# Patient Record
Sex: MALE | Race: WHITE | Employment: FULL TIME | URBAN - METROPOLITAN AREA
[De-identification: names, ages, dates, MRNs, and addresses within clinical notes are randomized per-mention and may not be internally consistent; named-entity substitution may affect disease eponyms.]

---

## 2018-04-06 PROBLEM — S00.31XA ABRASION, NOSE WITH INFECTION: Status: ACTIVE | Noted: 2018-04-06

## 2018-04-06 PROBLEM — L08.9 ABRASION, NOSE WITH INFECTION: Status: ACTIVE | Noted: 2018-04-06

## 2018-04-06 PROBLEM — Z12.11 COLON CANCER SCREENING: Status: ACTIVE | Noted: 2018-04-06

## 2019-05-15 ENCOUNTER — HOSPITAL ENCOUNTER (OUTPATIENT)
Dept: ULTRASOUND IMAGING | Age: 64
Discharge: HOME | End: 2019-05-15
Attending: NURSE PRACTITIONER

## 2019-05-15 DIAGNOSIS — Z87.19 HISTORY OF ABDOMINAL HERNIA: ICD-10-CM

## 2019-05-15 NOTE — PROGRESS NOTES
This has been fully explained to the patient, who indicates understanding that per Pinky NP noted US was negative for hernia

## 2019-09-24 PROBLEM — Z12.11 COLON CANCER SCREENING: Status: RESOLVED | Noted: 2018-04-06 | Resolved: 2019-09-24

## 2020-06-09 ENCOUNTER — HOSPITAL ENCOUNTER (OUTPATIENT)
Dept: PHYSICAL THERAPY | Age: 65
Discharge: HOME | End: 2020-06-09
Payer: COMMERCIAL

## 2020-06-09 DIAGNOSIS — G89.29 CHRONIC RIGHT SHOULDER PAIN: ICD-10-CM

## 2020-06-09 DIAGNOSIS — M25.511 CHRONIC RIGHT SHOULDER PAIN: ICD-10-CM

## 2020-06-09 PROCEDURE — 97161 PT EVAL LOW COMPLEX 20 MIN: CPT

## 2020-06-09 PROCEDURE — 97110 THERAPEUTIC EXERCISES: CPT

## 2020-06-09 NOTE — PROGRESS NOTES
Natalie Jim  : 1955  Payor: Ector Dent / [place]: [place] [facility] / Product Type: O /  [address] [facility] at [address] [place] [address]. [facility], [address] [place] [address]  [phone]   [phone]                                                          Marisela Cartwright MD      OUTPATIENT PHYSICAL THERAPY: Daily Treatment Note 2020 Visit Count:  1    Tx Diagnosis:  Pain in Right Shoulder (M25.511)  Stiffness of Right Shoulder not elsewhere specififed (M25.611)        Pre-treatment Symptoms/Complaints:  See Initial Eval Dated 6/10/2020 for more details. Pain: Initial:2/10 Post Session: 1/10   Medications Last Reviewed:  2020     Updated Objective Findings: See Initial Eval for more details. TREATMENT:   THERAPEUTIC EXERCISE: (25 minutes):  Exercises per grid below to improve mobility, strength and balance. Required minimal visual, verbal and manual cues to promote proper body alignment and promote proper body posture. Progressed resistance and complexity of movement as indicated. Date:  2020 Date:   Date:     Activity/Exercise Parameters Parameters Parameters   Education HEP, POC, PT goals, anatomy/pathology. Trigger points, Dry needling, Neck invovlvment. Differential diagnosis     Sleeper stretch 30sec     Thoracic extension 30 times     No Money 30 grn                             THERAPEUTIC ACTIVITY: ( 0 minutes): Activities per gid below to improve functional movement related mobility, strength and balance to improve neuro-muscular carryover to daily functional activities for improving patient's quality of life. Required visual, verbal and manual cues to promote proper body alignment and promote proper body posture/mechanics. Progressed resistance and complexity of movement as indicated.      Date:  2020 Date:   Date:     Activity/Exercise Parameters Parameters Parameters                             [place]                                             MANUAL THERAPY: (0 minutes): Joint mobilization, Soft tissue mobilization was utilized and necessary because of the patient's restricted joint motion and restricted motion of soft tissue mobility. Date  6/9/2020    Technique Used Grade  Level # Time(s) Effect while being performed                                                                 HEP Log Date 1.    6/9/2020   2.  6/9/2020   3. 6/9/2020   4.    5.           [employer] Portal  Treatment/Session Summary:    Response to Treatment: Pt demonstrated understanding of POC and initial HEP. No increase in pain or adverse reactions. Communication/Consultation:  POC, HEP, PT goals, Faxed initial evaluation to MD.   Equipment provided today: HEP Handout     Recommendations/Intent for next treatment session:   Next visit will focus on Manual Therapy Core Stability Dry Needling RTC strengthening soft tissue mobilization. Treatment Plan of Care Effective Dates: 6/9/2020 TO 8/9/2020 (60 days).   Frequency/Duration: 2 times a week for 60 Days             Total Treatment Billable Duration:   25  Rx plus Criss Jones, PT   TIME IN 5804  TIME OUT: [address]    Future Appointments   Date Time Provider Javan Holguin   6/16/2020  2:00 PM Chayito Webb, PT [facility]   5/24/2021  7:55 AM MAT LAB [facility]   6/3/2021 10:00 AM MD JESSICA Vega [facility]

## 2020-06-09 NOTE — THERAPY EVALUATION
Gucci Wilkinson  : 1955      Payor: Diamond Lo / Plan: [place] [facility] / Product Type: PPO /    [address]. [address] [place] [address], [facility], [address] [place] [address] Road  [phone]   [phone]              OUTPATIENT PHYSICAL THERAPY:Initial Assessment: 6/10/2020    ICD-10: Treatment Diagnosis:   Pain in Right Shoulder (M25.511)  Stiffness of Right Shoulder not elsewhere specififed (M25.611)              Precautions/Allergies:   Patient has no known allergies. Fall Risk Score: 1 (? 5 = High Risk)  MD Orders: Eval and Treat  MEDICAL/REFERRING DIAGNOSIS:  Chronic right shoulder pain [M25.511, G89.29]   DATE OF ONSET: 2 months ago  REFERRING PHYSICIAN: Mary Huynh MD  RETURN PHYSICIAN APPOINTMENT: TBD by patient      INITIAL ASSESSMENT:   Mr. Hinds Nicely presents to physical therapy with decreased strength, ROM, joint mobility, functional mobility. These S/S are consistent with Shoulder Pain. Patient will benefit from skilled physical therapy for manual therapeutic techniques (as appropriate), therapeutic exercises and activities, neuromuscular re-education, and comprehensive home exercises program to address current impairments and functional limitations. Gucci Wilkinson will benefit from skilled PT (medically necessary) in order to address above deficits affecting participation in basic ADLs and overall functional tolerance. PROBLEM LIST (Impacting functional limitations):  · Decreased Strength  · Decreased ADL/Functional Activities  · Increased Pain  · Decreased Activity Tolerance  · Increased Shortness of Breath  · Decreased Flexibility/Joint Mobility  · Decreased [place] with Home Exercise Program INTERVENTIONS PLANNED:  1. Cold  2. Family Education  3. Home Exercise Program (HEP)  4. Manual Therapy  5. Neuromuscular Re-education/Strengthening  6. Range of Motion (ROM)  7. Therapeutic Activites  8.  Therapeutic Exercise/Strengthening  9. Transfer Training  10. Ultrasound   TREATMENT PLAN:  Effective Dates: 6/10/2020 TO 8/9/2020 (60 days). Frequency/Duration: 2 times a week for 60 Days  GOALS: (Goals have been discussed and agreed upon with patient.)     Short-Term Goals~4 weeks  Goal Met   1. Ivana Santana will report <=2/10 pain with don/doffing clothing as well as minimal/no difficulty. 1.  [] Date:   2. Ivana Santana will demonstrate improvement in active shoulder Flexion to >180 degrees to increase UE function and participation in ADLs. 2.  [] Date:   3. Ivana Santana will demonstrate demonstrate improvement in active shoulder Internal ROtaiton to >T12 degrees to increase UE function and participation in ADLs. 3.  [] Date:   4. Ivana Santana will show a greater than 8 point decrease on the DASH in order to show an increase in upper extremity function. 4.  [] Date:   5. Ivana Santana will be independent in all HEP 5.  [] Date:   6.  6.  [] Date:         Long Term Goals~8 weeks Goal Met   1. Ivana Santana will show full ROM of the UE in order to return to full functional mobility  1. [] Date:   2. Ivana Santana will show a greater than 15 point decrease on the DASH in order to show an increase in upper extremity function 2. [] Date:   3. Ivana Santana will report doing hair/bathing without difficulty and <=2/10 pain in order to be independent with ADL's 3.  [] Date:   4. Ivana Santana will be independent in all advanced HEP 4.  [] Date:            Outcome Measure: Tool Used: Disabilities of the Arm, Shoulder and Hand (DASH) Questionnaire - Quick Version  Score:  Initial: 27%  Most Recent: X/55 (Date: -- )   Interpretation of Score: The DASH is designed to measure the activities of daily living in person's with upper extremity dysfunction or pain. Each section is scored on a 1-5 scale, 5 representing the greatest disability. The scores of each section are added together for a total score of 55. Medical Necessity:   · Skilled intervention continues to be required due to deficits and impairments seen upon initial evaluation affecting patient's participation in ADLs and functional tasks. Reason for Services/Other Comments:  · Patient continues to require skilled intervention due to deficits and impairments seen upon initial evaluation affecting patient's participation in ADLs and functional tasks. Total Treatment Duration:   TIME IN: 1405  TIME OUT: 1502  Rehabilitation Potential For Stated Goals: excellent  Regarding Nidhi Steinerxander's therapy, I certify that the treatment plan above will be carried out by a therapist or under their direction. Thank you for this referral,  Alexis Sanchez, PT     Referring Physician Signature: Chad Sanchez MD              Date                    HISTORY:   History of Present Injury/Illness (Reason for Referral):  Pt reports a history of shoulder injury from skiing about two years ago. Pt had since felt a difference in each side but slowly imporved over time. Approximately two months ago he reaggravated his shoulder when he was on the roof and has since noticed a decreased actviity tolerance In his right shoulder that will sometimes radiate to his right elbow.      -Present symptoms/complaints (on day of evaluation)  Pain Scale:  · Current: 3/10  · Best: 0/10  · Worst: 5/10    · Aggravating factors: Lifting, Carrying, Placing cup on top shelf and Overhead activities  · Relieving factors: rest by side  · Irritability: Low (Onset of pain is is longer than the time it takes for Pain to go away)    Dominant Side:  right  Past Medical History/Comorbidities:   Mr. Franklyn Messer  has no past medical history on file. Mr. Franklyn Messer  has a past surgical history that includes hx other surgical and hx hernia repair.   Social History/Living Environment:      Prior Level of Function/Work/Activity:  Normal  Other Clinical Tests:         none  Current Medications:    Current Outpatient Medications:     varicella-zoster recombinant, PF, (Shingrix, PF,) 50 mcg/0.5 mL susr injection, IM: 0.5 mL administered as a 2-dose series at 0 and 2 to 6 months later, Disp: 0.5 mL, Rfl: 1    methylPREDNISolone (Medrol, Volodymyr,) 4 mg tablet, Take 1 Tab by mouth Specific Days and Specific Times. , Disp: 1 Dose Pack, Rfl: 0    tadalafiL (CIALIS) 5 mg tablet, Take 1 Tab by mouth daily. , Disp: 30 Tab, Rfl: 5    multivitamin (ONE A DAY) tablet, Take 1 Tab by mouth daily. Takes occassionally, Disp: , Rfl:         Ambulatory/Rehab Services H2 Model Falls Risk Assessment    Risk Factors:       (1)  Gender [Male] Ability to Rise from Chair:       (0)  Ability to rise in a single movement    Falls Prevention Plan:       No modifications necessary   Total: (5 or greater = High Risk): 1    ©2010 [facility] of [employer]. All Rights Reserved. [facility] [employer] Patent #[id number]. Federal Law prohibits the replication, distribution or use without written permission from [facility] [employer]       Date Last Reviewed:  6/10/2020   Number of Personal Factors/Comorbidities that affect the Plan of Care: 0: LOW COMPLEXITY   EXAMINATION:   Observation/Orthostatic Postural Assessment:     Forward Head and Rounded Shoulders  Palpation:          Increased tone of Infras/ teres minor  ROM:    AROM/PROM         Joint: Eval Date: 6/10/2020  Re-Assess Date:  Re-Assess Date:    ACTIVE ROM (standing) RIGHT LEFT RIGHT LEFT RIGHT LEFT   Shoulder Flexion 170 170           Shoulder Abduction 130 123           Shoulder Internal Rotation (Apley's) T12 L3           Shoulder External Rotation (Apley's) FULL FULL           Elbow ROM             PASSIVE ROM (supine)             Shoulder Flexion             Shoulder Abduction             Shoulder Internal Rotation             Shoulder External Rotation                                           [facility]      Strength:    Joint[de-identified] Date: 6/10/2020  Re-Assess Date:  Re-Assess Date:     RIGHT LEFT RIGHT LEFT RIGHT LEFT   Shoulder Flexion 4/5 4/5           Shoulder Abduction  (C5) 4+/5 4/5           Shoulder Internal Rotation 4/5 4/5           Shoulder External Rotation 4+/5 4/5           Elbow Flexion  (C6) 4-/5 4-/5           Elbow Extension (C7) 5/5 5/5           Wrist Flexion (C7) 5/5 4+/5           Wrist Extension (C6)             Resisted Thumb Extension/Finger Abduction (C8/T1)             Resisted Cervical Rotation (C1):             Resisted Shoulder Shrug (C2, 3, 4):               Strength                                                        Manual:  Joint Directon Grade Treatment Effect    Distraction and Posterior Inferior II and III Unremarkable     Special Tests:     Neer's: Negative   Painful Arc   Weakness    Neurological Screen: Assessed @ Initial Visit    Radiating symptoms? Yes/No=none  Functional Mobility:  Assessed @ Initial Visit         Body Structures Involved:  1. Bones  2. Joints  3. Muscles  4. Ligaments Body Functions Affected:  1. Sensory/Pain  2. Neuromusculoskeletal  3. Movement Related Activities and Participation Affected:  1. Mobility  2. Self Care   Number of elements that affect the Plan of Care: 3: MODERATE COMPLEXITY   CLINICAL PRESENTATION:   Presentation: Evolving clinical presentation with changing clinical characteristics: MODERATE COMPLEXITY   CLINICAL DECISION MAKING:      Use of outcome tool(s) and clinical judgement create a POC that gives a: Clear prediction of patient's progress: LOW COMPLEXITY     See associated treatment note for treatment provided today.     Future Appointments   Date Time Provider Javan Holguin   6/16/2020  2:00 PM Laury Rangel PT [facility]   5/24/2021  7:55 AM MAT LAB [facility]   6/3/2021 10:00 AM Jose L Rollins [place] MD [facility]         Zonia Oconnell, PT

## 2020-06-16 ENCOUNTER — HOSPITAL ENCOUNTER (OUTPATIENT)
Dept: PHYSICAL THERAPY | Age: 65
Discharge: HOME | End: 2020-06-16
Payer: COMMERCIAL

## 2020-06-16 PROCEDURE — 97110 THERAPEUTIC EXERCISES: CPT

## 2020-06-16 PROCEDURE — 97140 MANUAL THERAPY 1/> REGIONS: CPT

## 2020-06-16 NOTE — PROGRESS NOTES
Natalie Jim  : 1955  Payor: Ector Dent / Plan: [place] [employer] [facility] / Product Type: PPO /  [address]. [facility], [address] [place] [address], [facility], [address] [place] [address]  [phone]   [phone]                                                          Marisela Cartwright MD      OUTPATIENT PHYSICAL THERAPY: Daily Treatment Note 2020 Visit Count:  2    Tx Diagnosis:  Pain in Right Shoulder (M25.511)  Stiffness of Right Shoulder not elsewhere specififed (M25.611)        Pre-treatment Symptoms/Complaints:  Pt reports that he doesnt have any pain while exercising but he feels like his arm feels slightly numb. Pain: Initial:2/10 Post Session: 1/10   Medications Last Reviewed:  2020     Updated Objective Findings: See Initial Eval for more details. TREATMENT:   THERAPEUTIC EXERCISE: (45 minutes):  Exercises per grid below to improve mobility, strength and balance. Required minimal visual, verbal and manual cues to promote proper body alignment and promote proper body posture. Progressed resistance and complexity of movement as indicated. Date:  2020 Date:  2020 Date:     Activity/Exercise Parameters Parameters Parameters   Education HEP, POC, PT goals, anatomy/pathology. Trigger points, Dry needling, Neck invovlvment. Differential diagnosis     Sleeper stretch 30sec 30xs    Thoracic extension 30 times 3 min    No Money 30 grn Black  Band 30xs    I,T,Y  30xs each    rows  Purple 30xs     walkouts  30xs    punches  30xs    SNAGS  4 min                THERAPEUTIC ACTIVITY: ( 0 minutes): Activities per gid below to improve functional movement related mobility, strength and balance to improve neuro-muscular carryover to daily functional activities for improving patient's quality of life. Required visual, verbal and manual cues to promote proper body alignment and promote proper body posture/mechanics.  Progressed resistance and complexity of movement as indicated. Date:  6/9/2020 Date:  6/16/2020 Date:     Activity/Exercise Parameters Parameters Parameters                                                                               MANUAL THERAPY: (0 minutes): Joint mobilization, Soft tissue mobilization was utilized and necessary because of the patient's restricted joint motion and restricted motion of soft tissue mobility. Date  6/16/2020    Technique Used Grade Level # Time(s) Effect while being performed   traction II,III Cervical 11 Improved subjective symptoms                                                          HEP Log Date 1.    6/16/2020   2.  6/16/2020   3. 6/16/2020   4.    5.           [employer] Portal  Treatment/Session Summary:    Response to Treatment: Pt continued with strengthening but respopnded favorably to distraction. No pain wiwth exercises and had improved shoulder discomfort after traction   Communication/Consultation:  POC, HEP, PT goals, Faxed initial evaluation to MD.   Equipment provided today: HEP Handout     Recommendations/Intent for next treatment session:   Next visit will focus on Manual Therapy Core Stability Dry Needling RTC strengthening soft tissue mobilization. Treatment Plan of Care Effective Dates: 6/9/2020 TO 8/9/2020 (60 days).   Frequency/Duration: 2 times a week for 60 Days             Total Treatment Billable Duration:   56  Rx  PT Patient Time In/Time Out  Time In: 8407  Time Out: Lawrence 70, PT     Future Appointments   Date Time Provider Javan Holguin   6/19/2020  7:30 AM Fariha Enriquez PT [facility]   5/24/2021  7:55 AM MAT LAB [facility] ALY[facility]   6/3/2021 10:00 AM Sony Arriaga MD [facility] LAYO [facility]

## 2020-06-19 ENCOUNTER — HOSPITAL ENCOUNTER (OUTPATIENT)
Dept: PHYSICAL THERAPY | Age: 65
Discharge: HOME | End: 2020-06-19
Payer: COMMERCIAL

## 2020-06-19 PROCEDURE — 97140 MANUAL THERAPY 1/> REGIONS: CPT

## 2020-06-19 PROCEDURE — 97110 THERAPEUTIC EXERCISES: CPT

## 2020-06-19 NOTE — PROGRESS NOTES
Natalie Jim  : 1955  Payor: Ector Dent / [place]: [place] [employer] [facility] / Product Type: PPO /  [address]. [facility], [address] [place] [address], [facility], [address] [place] [address]  [phone]   [phone]                                                          Marisela Cartwright MD      OUTPATIENT PHYSICAL THERAPY: Daily Treatment Note 2020 Visit Count:  3    Tx Diagnosis:  Pain in Right Shoulder (M25.511)  Stiffness of Right Shoulder not elsewhere specififed (M25.611)        Pre-treatment Symptoms/Complaints:  Pt reports mild shoulder soreness from exercises. Mild pain in forearm. Pain: Initial:2/10 Post Session: 1/10   Medications Last Reviewed:  2020     Updated Objective Findings: + cozens        TREATMENT:   THERAPEUTIC EXERCISE: (42 minutes):  Exercises per grid below to improve mobility, strength and balance. Required minimal visual, verbal and manual cues to promote proper body alignment and promote proper body posture. Progressed resistance and complexity of movement as indicated. Date:  2020 Date:  2020 Date:  2020   Activity/Exercise Parameters Parameters Parameters   Education HEP, POC, PT goals, anatomy/pathology. Trigger points, Dry needling, Neck invovlvment. Differential diagnosis  Education on anatomy and biomechanics of common extensor tendon and tennis elbow. Sleeper stretch 30sec 30xs    Thoracic extension 30 times 3 min    No Money 30 grn Black  Band 30xs    I,T,Y  30xs each    rows  Purple 30xs     walkouts  30xs    punches  30xs Punch progression 30xs blue band   SNAGS  4 min    UBE   8 min   taping   2 min   Jose R Twist   Yellow 20xs   Wrist extension   30xs blue 2 up 4 down eccentric         THERAPEUTIC ACTIVITY: ( 0 minutes):  Activities per gid below to improve functional movement related mobility, strength and balance to improve neuro-muscular carryover to daily functional activities for improving patient's quality of life. Required visual, verbal and manual cues to promote proper body alignment and promote proper body posture/mechanics. Progressed resistance and complexity of movement as indicated. Date:  6/9/2020 Date:  6/16/2020 Date:  6/19/2020   Activity/Exercise Parameters Parameters Parameters                                                                               MANUAL THERAPY: (12 minutes): Joint mobilization, Soft tissue mobilization was utilized and necessary because of the patient's restricted joint motion and restricted motion of soft tissue mobility. Date  6/19/2020    Technique Used Grade Level # Time(s) Effect while being performed   Active release  Common extensor tendon 12 Reproduction of discomfort                                                          HEP Log Date 1.    6/19/2020   2.  6/19/2020   3. 6/19/2020   4.    5.           [employer] Portal  Treatment/Session Summary:    Response to Treatment: Pt focused on elbow pain today. Able to target area with mild production of symptoms that persisted after manual. Taping was added to take pressure of load on common extensor tendon. Communication/Consultation:  POC, HEP, PT goals, Faxed initial evaluation to MD.   Equipment provided today: HEP Handout     Recommendations/Intent for next treatment session:   Next visit will focus on Manual Therapy Core Stability Dry Needling RTC strengthening soft tissue mobilization. Treatment Plan of Care Effective Dates: 6/9/2020 TO 8/9/2020 (60 days).   Frequency/Duration: 2 times a week for 60 Days             Total Treatment Billable Duration:   54  Rx  PT Patient Time In/Time Out  Time In: 7605  Time Out: 0225  Tina Jones PT     Future Appointments   Date Time Provider Javan Holguin   6/23/2020  4:00 PM Tamiko Jenkins [facility]   6/26/2020  7:30 AM Chayito Webb PT [facility]   5/24/2021  7:55 AM MAT LAB [facility]   6/3/2021 10:00 AM Marisela Cartwright MD [facility]

## 2020-06-26 ENCOUNTER — HOSPITAL ENCOUNTER (OUTPATIENT)
Dept: PHYSICAL THERAPY | Age: 65
Discharge: HOME | End: 2020-06-26
Payer: COMMERCIAL

## 2020-06-26 PROCEDURE — 97530 THERAPEUTIC ACTIVITIES: CPT

## 2020-06-26 PROCEDURE — 97110 THERAPEUTIC EXERCISES: CPT

## 2020-06-26 PROCEDURE — 97140 MANUAL THERAPY 1/> REGIONS: CPT

## 2020-06-26 NOTE — PROGRESS NOTES
Justa Adel  : 1955  Payor: Brice Blackburn / Plan: [facility] / Product Type: O /  [address] [facility] at [address] [place] [address]. [facility], [address] [place] [address], [facility], [address] [place] [address]  [phone]   [phone]                                                          Mariah Mcclure MD      OUTPATIENT PHYSICAL THERAPY: Daily Treatment Note 2020 Visit Count:  4    Tx Diagnosis:  Pain in Right Shoulder (M25.511)  Stiffness of Right Shoulder not elsewhere specififed (M25.611)        Pre-treatment Symptoms/Complaints:  Pt reports that he had a couple of days with completely no pain in her arm or shoulder but he felt last night his forearm start to bother him. Pain: Initial:2/10 Post Session: 1/10   Medications Last Reviewed:  2020     Updated Objective Findings: + cozens        TREATMENT:   THERAPEUTIC EXERCISE: (19 minutes):  Exercises per grid below to improve mobility, strength and balance. Required minimal visual, verbal and manual cues to promote proper body alignment and promote proper body posture. Progressed resistance and complexity of movement as indicated. Date:  2020 Date:  2020 Date:  2020 Date  2020   Activity/Exercise Parameters Parameters Parameters    Education HEP, POC, PT goals, anatomy/pathology. Trigger points, Dry needling, Neck invovlvment. Differential diagnosis  Education on anatomy and biomechanics of common extensor tendon and tennis elbow. Education on neuromuscular    Sleeper stretch 30sec 30xs     Thoracic extension 30 times 3 min     No Money 30 grn Black  Band 30xs     I,T,Y  30xs each     rows  Purple 30xs      walkouts  30xs     punches  30xs Punch progression 30xs blue band 30xs black band   SNAGS  4 min     UBE   8 min 8 UBE   taping   2 min    Jose R Twist   Yellow 20xs 20xs   Wrist extension   30xs blue 2 up 4 down eccentric 30xs blue          THERAPEUTIC ACTIVITY: ( 12 minutes):  Activities per gid below to improve functional movement related mobility, strength and balance to improve neuro-muscular carryover to daily functional activities for improving patient's quality of life. Required visual, verbal and manual cues to promote proper body alignment and promote proper body posture/mechanics. Progressed resistance and complexity of movement as indicated. Date:  6/9/2020 Date:  6/16/2020 Date:  6/19/2020 Date  6/26/2020   Activity/Exercise Parameters Parameters Parameters    Bucket carry       30 lbs 4xs   Overhead carry       15 lbs 4xs   Landmine press       Bar 20xs each                                                 MANUAL THERAPY: (23 minutes): Joint mobilization, Soft tissue mobilization was utilized and necessary because of the patient's restricted joint motion and restricted motion of soft tissue mobility. Date  6/26/2020    Technique Used Grade Level # Time(s) Effect while being performed   Active release  Common extensor tendon 12 Reproduction of discomfort   Cervical traction   11 Improved neck motion                                                   HEP Log Date 1.    6/26/2020   2.  6/26/2020   3. 6/26/2020   4.    5.           [employer] Portal  Treatment/Session Summary:    Response to Treatment: Pt continud with shoulder strengthening and progressed with open chain exercises to improve scapular and RTC strengthening. Pt continues to benefit from manual therapy to improve extensor pain. Communication/Consultation:  POC, HEP, PT goals, Faxed initial evaluation to MD.   Equipment provided today: HEP Handout     Recommendations/Intent for next treatment session:   Next visit will focus on Manual Therapy Core Stability Dry Needling RTC strengthening soft tissue mobilization. Treatment Plan of Care Effective Dates: 6/9/2020 TO 8/9/2020 (60 days).   Frequency/Duration: 2 times a week for 60 Days             Total Treatment Billable Duration:   54  Rx  PT Patient Time In/Time Out  Time In: 5838  Time Out: [address], PT     Future Appointments   Date Time Provider Javan Greggi   6/30/2020  7:30 AM Saranya Ozuna PT [facility]   5/24/2021  7:55 AM MAT LAB [facility]   6/3/2021 10:00 AM Nory Berry MD [facility]

## 2020-06-30 ENCOUNTER — HOSPITAL ENCOUNTER (OUTPATIENT)
Dept: PHYSICAL THERAPY | Age: 65
Discharge: HOME | End: 2020-06-30
Payer: COMMERCIAL

## 2020-06-30 PROCEDURE — 97140 MANUAL THERAPY 1/> REGIONS: CPT

## 2020-06-30 PROCEDURE — 97110 THERAPEUTIC EXERCISES: CPT

## 2020-06-30 PROCEDURE — 97530 THERAPEUTIC ACTIVITIES: CPT

## 2020-06-30 NOTE — PROGRESS NOTES
Mary Julian  : 1955  Payor: Mateo Niraj / Plan: [facility] / Product Type: PPO /  [address] [facility] at [address] [place] [address]. [facility], [address] [place] [address], [address] [place] [address]  [phone]   [phone]                                                          Katlyn Ramirez MD      OUTPATIENT PHYSICAL THERAPY: Daily Treatment Note 2020 Visit Count:  5    Tx Diagnosis:  Pain in Right Shoulder (M25.511)  Stiffness of Right Shoulder not elsewhere specififed (M25.611)        Pre-treatment Symptoms/Complaints:  Pt reports that he feels like he hadminor pain throughout the week and started slowly getting better. Pain: Initial:2/10 Post Session: 1/10   Medications Last Reviewed:  2020     Updated Objective Findings: + cozens        TREATMENT:   THERAPEUTIC EXERCISE: (38 minutes):  Exercises per grid below to improve mobility, strength and balance. Required minimal visual, verbal and manual cues to promote proper body alignment and promote proper body posture. Progressed resistance and complexity of movement as indicated. Date:  2020 Date:  2020 Date:  2020 Date  2020 Date  2020   Activity/Exercise Parameters Parameters Parameters     Education HEP, POC, PT goals, anatomy/pathology. Trigger points, Dry needling, Neck invovlvment. Differential diagnosis  Education on anatomy and biomechanics of common extensor tendon and tennis elbow.    Education on neuromuscular     Sleeper stretch 30sec 30xs      Thoracic extension 30 times 3 min   30'xs black band   No Money 30 grn Black  Band 30xs      I,T,Y  30xs each      rows  Purple 30xs    13 each 30xs   walkouts  30xs      punches  30xs Punch progression 30xs blue band 30xs black band 30xs black band   SNAGS  4 min      UBE   8 min 8 UBE 8 min   taping   2 min     Jose R Twist   Yellow 20xs 20xs 30xs blue   Wrist extension   30xs blue 2 up 4 down eccentric 30xs blue  30xs blue THERAPEUTIC ACTIVITY: ( 8 minutes): Activities per gid below to improve functional movement related mobility, strength and balance to improve neuro-muscular carryover to daily functional activities for improving patient's quality of life. Required visual, verbal and manual cues to promote proper body alignment and promote proper body posture/mechanics. Progressed resistance and complexity of movement as indicated. Date:  6/9/2020 Date:  6/16/2020 Date:  6/19/2020 Date  6/26/2020 Date  6/30/2020   Activity/Exercise Parameters Parameters Parameters     Bucket carry       30 lbs 4xs 30 lbs 4xs   Overhead carry       15 lbs 4xs 20 lbs 4xs   Landmine press       Bar 20xs each                                                      MANUAL THERAPY: (10 minutes): Joint mobilization, Soft tissue mobilization was utilized and necessary because of the patient's restricted joint motion and restricted motion of soft tissue mobility. Date  6/30/2020    Technique Used Grade Level # Time(s) Effect while being performed   Dry needling   10 min Common extensor Tendon                                                          HEP Log Date 1.    6/30/2020   2.  6/30/2020   3. 6/30/2020   4.    5.           [facility] Portal  Treatment/Session Summary:    Response to Treatment: COnitnued with shoulder strengthening and started dry needling to common extensor tendon to ease elbow pain. Pt elicited twitch response and had mild soreness after. No adverse effects after needling. Communication/Consultation:  POC, HEP, PT goals, Faxed initial evaluation to MD.   Equipment provided today: HEP Handout     Recommendations/Intent for next treatment session:   Next visit will focus on Manual Therapy Core Stability Dry Needling RTC strengthening soft tissue mobilization. Treatment Plan of Care Effective Dates: 6/9/2020 TO 8/9/2020 (60 days).   Frequency/Duration: 2 times a week for 60 Days       Dry Needles Used: 1   Dry Needles Removed: 1           Total Treatment Billable Duration:   54  Rx  PT Patient Time In/Time Out  Time In: 7118  Time Out: [address], PT     Future Appointments   Date Time Provider Javan Holguin   7/8/2020  7:30 AM Nicolasa Hamilton, PT [facility]   5/24/2021  7:55 AM MAT LAB [facility]   6/3/2021 10:00 AM Radha Humphries MD [facility]

## 2020-07-08 ENCOUNTER — HOSPITAL ENCOUNTER (OUTPATIENT)
Dept: PHYSICAL THERAPY | Age: 65
Discharge: HOME | End: 2020-07-08
Payer: COMMERCIAL

## 2020-07-08 PROCEDURE — 97530 THERAPEUTIC ACTIVITIES: CPT

## 2020-07-08 PROCEDURE — 97110 THERAPEUTIC EXERCISES: CPT

## 2020-07-08 NOTE — PROGRESS NOTES
Barbara López  : 1955  Payor: Adan Marte / Plan: [facility] / Product Type: O /  [address] [facility] at [address] [place] [address]. [facility], [address] [place] [address], [address]  [phone]   [phone]                                                          Jessi Peterson MD      OUTPATIENT PHYSICAL THERAPY: Daily Treatment Note 2020 Visit Count:  6    Tx Diagnosis:  Pain in Right Shoulder (M25.511)  Stiffness of Right Shoulder not elsewhere specififed (M25.611)        Pre-treatment Symptoms/Complaints:  Pt self reports an imporvement of 85% in his forearm and very mild pain. Pain: Initial:2/10 Post Session: 1/10   Medications Last Reviewed:  2020     Updated Objective Findings: + cozens        TREATMENT:   THERAPEUTIC EXERCISE: (17 minutes):  Exercises per grid below to improve mobility, strength and balance. Required minimal visual, verbal and manual cues to promote proper body alignment and promote proper body posture. Progressed resistance and complexity of movement as indicated. Date:  2020 Date:  2020 Date  2020 Date  2020 Date  2020   Activity/Exercise Parameters Parameters      Education  Education on anatomy and biomechanics of common extensor tendon and tennis elbow. Education on neuromuscular      Sleeper stretch 30xs       Thoracic extension 3 min   30'xs black band    No Money Black  Band 30xs       I,T,Y 30xs each       rows Purple 30xs    13 each 30xs 10.5 3x10   walkouts 30xs       punches 30xs Punch progression 30xs blue band 30xs black band 30xs black band    SNAGS 4 min        UBE  8 min 8 UBE 8 min 8 min   taping  2 min      Jose R Twist  Yellow 20xs 20xs 30xs blue 30xs   Wrist extension  30xs blue 2 up 4 down eccentric 30xs blue  30xs blue  30xs         THERAPEUTIC ACTIVITY: ( 43 minutes):  Activities per gid below to improve functional movement related mobility, strength and balance to improve neuro-muscular carryover to daily functional activities for improving patient's quality of life. Required visual, verbal and manual cues to promote proper body alignment and promote proper body posture/mechanics. Progressed resistance and complexity of movement as indicated. Date:  6/16/2020 Date:  6/19/2020 Date  6/26/2020 Date  6/30/2020 Date  7/8/2020   Activity/Exercise Parameters Parameters      Bucket carry     30 lbs 4xs 30 lbs 4xs 30 lbs 4xs   Overhead carry     15 lbs 4xs 20 lbs 4xs 20 lbs 4xs   Landmine press     Bar 20xs each     Shoulder taps       30xs   Diagonals       30xs blue   Body blades       1 min 3 xs     Serratus punch       3x10         MANUAL THERAPY: (0 minutes): Joint mobilization, Soft tissue mobilization was utilized and necessary because of the patient's restricted joint motion and restricted motion of soft tissue mobility. Date  7/8/2020    Technique Used Grade Level # Time(s) Effect while being performed                                                                 HEP Log Date 1.    7/8/2020   2.  7/8/2020   3. 7/8/2020   4.    5.           [employer] Portal  Treatment/Session Summary:    Response to Treatment: Focused on Shoulder Strengthening and RTC stabilization. No pain with exercises and had improved tolerance to gripping at common extensor tendon. Communication/Consultation:  POC, HEP, PT goals, Faxed initial evaluation to MD.   Equipment provided today: HEP Handout     Recommendations/Intent for next treatment session:   Next visit will focus on Manual Therapy Core Stability Dry Needling RTC strengthening soft tissue mobilization. Treatment Plan of Care Effective Dates: 6/9/2020 TO 8/9/2020 (60 days).   Frequency/Duration: 2 times a week for 60 Days       Dry Needles Used: 1   Dry Needles Removed: 1           Total Treatment Billable Duration:   60  Rx  PT Patient Time In/Time Out  Time In: 0730  Time Out: [address] Baljit Hernández PT Future Appointments   Date Time Provider Javan Holguin   7/14/2020  7:30 AM Suraj Calderon PT [facility]   5/24/2021  7:55 AM MAT LAB [facility]   6/3/2021 10:00 AM Christin Fitzpatrick MD [facility]

## 2021-06-03 PROBLEM — M50.30 DEGENERATIVE DISC DISEASE, CERVICAL: Status: ACTIVE | Noted: 2017-01-23

## 2021-06-03 PROBLEM — L08.9 ABRASION, NOSE WITH INFECTION: Status: RESOLVED | Noted: 2018-04-06 | Resolved: 2021-06-03

## 2021-06-03 PROBLEM — S00.31XA ABRASION, NOSE WITH INFECTION: Status: RESOLVED | Noted: 2018-04-06 | Resolved: 2021-06-03

## 2022-03-20 PROBLEM — M50.30 DEGENERATIVE DISC DISEASE, CERVICAL: Status: ACTIVE | Noted: 2017-01-23

## 2022-05-24 DIAGNOSIS — Z00.00 WELCOME TO MEDICARE PREVENTIVE VISIT: Primary | ICD-10-CM

## 2022-06-09 ENCOUNTER — NURSE ONLY (OUTPATIENT)
Dept: INTERNAL MEDICINE CLINIC | Facility: CLINIC | Age: 67
End: 2022-06-09

## 2022-06-09 DIAGNOSIS — Z00.00 WELCOME TO MEDICARE PREVENTIVE VISIT: ICD-10-CM

## 2022-06-10 LAB
ALBUMIN SERPL-MCNC: 4.1 G/DL (ref 3.2–4.6)
ALBUMIN/GLOB SERPL: 1.4 {RATIO} (ref 1.2–3.5)
ALP SERPL-CCNC: 85 U/L (ref 50–136)
ALT SERPL-CCNC: 25 U/L (ref 12–65)
ANION GAP SERPL CALC-SCNC: 7 MMOL/L (ref 7–16)
AST SERPL-CCNC: 16 U/L (ref 15–37)
BASOPHILS # BLD: 0 K/UL (ref 0–0.2)
BASOPHILS NFR BLD: 0 % (ref 0–2)
BILIRUB SERPL-MCNC: 1.5 MG/DL (ref 0.2–1.1)
BUN SERPL-MCNC: 16 MG/DL (ref 8–23)
CALCIUM SERPL-MCNC: 9.2 MG/DL (ref 8.3–10.4)
CHLORIDE SERPL-SCNC: 105 MMOL/L (ref 98–107)
CHOLEST SERPL-MCNC: 240 MG/DL
CO2 SERPL-SCNC: 27 MMOL/L (ref 21–32)
CREAT SERPL-MCNC: 1 MG/DL (ref 0.8–1.5)
DIFFERENTIAL METHOD BLD: NORMAL
EOSINOPHIL # BLD: 0.1 K/UL (ref 0–0.8)
EOSINOPHIL NFR BLD: 1 % (ref 0.5–7.8)
ERYTHROCYTE [DISTWIDTH] IN BLOOD BY AUTOMATED COUNT: 14.2 % (ref 11.9–14.6)
GLOBULIN SER CALC-MCNC: 3 G/DL (ref 2.3–3.5)
GLUCOSE SERPL-MCNC: 82 MG/DL (ref 65–100)
HCT VFR BLD AUTO: 45.5 % (ref 41.1–50.3)
HDLC SERPL-MCNC: 85 MG/DL (ref 40–60)
HDLC SERPL: 2.8 {RATIO}
HGB BLD-MCNC: 14.6 G/DL (ref 13.6–17.2)
IMM GRANULOCYTES # BLD AUTO: 0 K/UL (ref 0–0.5)
IMM GRANULOCYTES NFR BLD AUTO: 0 % (ref 0–5)
LDLC SERPL CALC-MCNC: 141.8 MG/DL
LYMPHOCYTES # BLD: 2 K/UL (ref 0.5–4.6)
LYMPHOCYTES NFR BLD: 35 % (ref 13–44)
MCH RBC QN AUTO: 29.9 PG (ref 26.1–32.9)
MCHC RBC AUTO-ENTMCNC: 32.1 G/DL (ref 31.4–35)
MCV RBC AUTO: 93.2 FL (ref 79.6–97.8)
MONOCYTES # BLD: 0.5 K/UL (ref 0.1–1.3)
MONOCYTES NFR BLD: 8 % (ref 4–12)
NEUTS SEG # BLD: 3.1 K/UL (ref 1.7–8.2)
NEUTS SEG NFR BLD: 56 % (ref 43–78)
NRBC # BLD: 0 K/UL (ref 0–0.2)
PLATELET # BLD AUTO: 268 K/UL (ref 150–450)
PMV BLD AUTO: 9.9 FL (ref 9.4–12.3)
POTASSIUM SERPL-SCNC: 4.6 MMOL/L (ref 3.5–5.1)
PROT SERPL-MCNC: 7.1 G/DL (ref 6.3–8.2)
RBC # BLD AUTO: 4.88 M/UL (ref 4.23–5.6)
SODIUM SERPL-SCNC: 139 MMOL/L (ref 136–145)
TRIGL SERPL-MCNC: 66 MG/DL (ref 35–150)
TSH, 3RD GENERATION: 1.4 UIU/ML (ref 0.36–3.74)
VLDLC SERPL CALC-MCNC: 13.2 MG/DL (ref 6–23)
WBC # BLD AUTO: 5.6 K/UL (ref 4.3–11.1)

## 2022-06-20 ENCOUNTER — OFFICE VISIT (OUTPATIENT)
Dept: INTERNAL MEDICINE CLINIC | Facility: CLINIC | Age: 67
End: 2022-06-20
Payer: MEDICARE

## 2022-06-20 VITALS
HEART RATE: 64 BPM | WEIGHT: 159 LBS | TEMPERATURE: 97.9 F | HEIGHT: 68 IN | OXYGEN SATURATION: 99 % | SYSTOLIC BLOOD PRESSURE: 118 MMHG | DIASTOLIC BLOOD PRESSURE: 76 MMHG | BODY MASS INDEX: 24.1 KG/M2

## 2022-06-20 DIAGNOSIS — Z12.11 COLON CANCER SCREENING: ICD-10-CM

## 2022-06-20 DIAGNOSIS — Z23 NEED FOR PROPHYLACTIC VACCINATION AGAINST STREPTOCOCCUS PNEUMONIAE (PNEUMOCOCCUS): ICD-10-CM

## 2022-06-20 DIAGNOSIS — Z00.00 WELCOME TO MEDICARE PREVENTIVE VISIT: Primary | ICD-10-CM

## 2022-06-20 DIAGNOSIS — Z11.4 SCREENING FOR HIV WITHOUT PRESENCE OF RISK FACTORS: ICD-10-CM

## 2022-06-20 DIAGNOSIS — Z23 NEED FOR PROPHYLACTIC VACCINATION AND INOCULATION AGAINST VARICELLA: ICD-10-CM

## 2022-06-20 PROBLEM — M54.2 NECK PAIN: Status: ACTIVE | Noted: 2017-01-23

## 2022-06-20 PROCEDURE — 3017F COLORECTAL CA SCREEN DOC REV: CPT | Performed by: INTERNAL MEDICINE

## 2022-06-20 PROCEDURE — 1123F ACP DISCUSS/DSCN MKR DOCD: CPT | Performed by: INTERNAL MEDICINE

## 2022-06-20 PROCEDURE — 90677 PCV20 VACCINE IM: CPT | Performed by: INTERNAL MEDICINE

## 2022-06-20 PROCEDURE — G0402 INITIAL PREVENTIVE EXAM: HCPCS | Performed by: INTERNAL MEDICINE

## 2022-06-20 SDOH — HEALTH STABILITY: PHYSICAL HEALTH: ON AVERAGE, HOW MANY MINUTES DO YOU ENGAGE IN EXERCISE AT THIS LEVEL?: 80 MIN

## 2022-06-20 SDOH — HEALTH STABILITY: PHYSICAL HEALTH: ON AVERAGE, HOW MANY DAYS PER WEEK DO YOU ENGAGE IN MODERATE TO STRENUOUS EXERCISE (LIKE A BRISK WALK)?: 4 DAYS

## 2022-06-20 ASSESSMENT — LIFESTYLE VARIABLES
HOW OFTEN DURING THE LAST YEAR HAVE YOU BEEN UNABLE TO REMEMBER WHAT HAPPENED THE NIGHT BEFORE BECAUSE YOU HAD BEEN DRINKING: 0
HOW OFTEN DURING THE LAST YEAR HAVE YOU FAILED TO DO WHAT WAS NORMALLY EXPECTED FROM YOU BECAUSE OF DRINKING: 0
HAS A RELATIVE, FRIEND, DOCTOR, OR ANOTHER HEALTH PROFESSIONAL EXPRESSED CONCERN ABOUT YOUR DRINKING OR SUGGESTED YOU CUT DOWN: NO
HOW OFTEN DURING THE LAST YEAR HAVE YOU FOUND THAT YOU WERE NOT ABLE TO STOP DRINKING ONCE YOU HAD STARTED: 0
HOW MANY STANDARD DRINKS CONTAINING ALCOHOL DO YOU HAVE ON A TYPICAL DAY: 1 OR 2
HOW OFTEN DURING THE LAST YEAR HAVE YOU FAILED TO DO WHAT WAS NORMALLY EXPECTED FROM YOU BECAUSE OF DRINKING: NEVER
HAVE YOU OR SOMEONE ELSE BEEN INJURED AS A RESULT OF YOUR DRINKING: 0
HOW OFTEN DO YOU HAVE SIX OR MORE DRINKS ON ONE OCCASION: 1
HOW OFTEN DO YOU HAVE A DRINK CONTAINING ALCOHOL: 5
HAS A RELATIVE, FRIEND, DOCTOR, OR ANOTHER HEALTH PROFESSIONAL EXPRESSED CONCERN ABOUT YOUR DRINKING OR SUGGESTED YOU CUT DOWN: 0
HOW OFTEN DURING THE LAST YEAR HAVE YOU NEEDED AN ALCOHOLIC DRINK FIRST THING IN THE MORNING TO GET YOURSELF GOING AFTER A NIGHT OF HEAVY DRINKING: 0
HAVE YOU OR SOMEONE ELSE BEEN INJURED AS A RESULT OF YOUR DRINKING: NO
HOW OFTEN DURING THE LAST YEAR HAVE YOU HAD A FEELING OF GUILT OR REMORSE AFTER DRINKING: NEVER
HOW OFTEN DURING THE LAST YEAR HAVE YOU FOUND THAT YOU WERE NOT ABLE TO STOP DRINKING ONCE YOU HAD STARTED: NEVER
HOW OFTEN DURING THE LAST YEAR HAVE YOU BEEN UNABLE TO REMEMBER WHAT HAPPENED THE NIGHT BEFORE BECAUSE YOU HAD BEEN DRINKING: NEVER
HOW OFTEN DO YOU HAVE A DRINK CONTAINING ALCOHOL: 4 OR MORE TIMES A WEEK
HOW MANY STANDARD DRINKS CONTAINING ALCOHOL DO YOU HAVE ON A TYPICAL DAY: 1
HOW OFTEN DURING THE LAST YEAR HAVE YOU NEEDED AN ALCOHOLIC DRINK FIRST THING IN THE MORNING TO GET YOURSELF GOING AFTER A NIGHT OF HEAVY DRINKING: NEVER
HOW OFTEN DURING THE LAST YEAR HAVE YOU HAD A FEELING OF GUILT OR REMORSE AFTER DRINKING: 0

## 2022-06-20 ASSESSMENT — PATIENT HEALTH QUESTIONNAIRE - PHQ9
SUM OF ALL RESPONSES TO PHQ QUESTIONS 1-9: 0
2. FEELING DOWN, DEPRESSED OR HOPELESS: 0
SUM OF ALL RESPONSES TO PHQ QUESTIONS 1-9: 0
1. LITTLE INTEREST OR PLEASURE IN DOING THINGS: 0
SUM OF ALL RESPONSES TO PHQ9 QUESTIONS 1 & 2: 0

## 2022-06-20 ASSESSMENT — VISUAL ACUITY
OD_CC: 20/20
OS_CC: 20/20

## 2022-06-20 NOTE — PATIENT INSTRUCTIONS
Personalized Preventive Plan for Kelley Hung - 6/20/2022  Medicare offers a range of preventive health benefits. Some of the tests and screenings are paid in full while other may be subject to a deductible, co-insurance, and/or copay. Some of these benefits include a comprehensive review of your medical history including lifestyle, illnesses that may run in your family, and various assessments and screenings as appropriate. After reviewing your medical record and screening and assessments performed today your provider may have ordered immunizations, labs, imaging, and/or referrals for you. A list of these orders (if applicable) as well as your Preventive Care list are included within your After Visit Summary for your review. Other Preventive Recommendations:    · A preventive eye exam performed by an eye specialist is recommended every 1-2 years to screen for glaucoma; cataracts, macular degeneration, and other eye disorders. · A preventive dental visit is recommended every 6 months. · Try to get at least 150 minutes of exercise per week or 10,000 steps per day on a pedometer . · Order or download the FREE \"Exercise & Physical Activity: Your Everyday Guide\" from [employer]. Call 2-991.954.2794 or search [employer] online. · You need 6293-0922 mg of calcium and 1823-9799 IU of vitamin D per day. It is possible to meet your calcium requirement with diet alone, but a vitamin D supplement is usually necessary to meet this goal.  · When exposed to the sun, use a sunscreen that protects against both UVA and UVB radiation with an SPF of 30 or greater. Reapply every 2 to 3 hours or after sweating, drying off with a towel, or swimming. · Always wear a seat belt when traveling in a car. Always wear a helmet when riding a bicycle or motorcycle.

## 2022-06-20 NOTE — PROGRESS NOTES
Medicare Annual Wellness Visit    Venancio Berry is here for Medicare AWV and Results    Assessment & Plan   Welcome to Medicare preventive visit  Need for prophylactic vaccination against Streptococcus pneumoniae (pneumococcus)  -     Pneumococcal Conjugate PCV20, PF (Prevnar 20)  Need for prophylactic vaccination and inoculation against varicella  Screening for HIV without presence of risk factors  -     HIV 1/2 Ag/Ab, 4TH Generation,W Rflx Confirm; Future  Colon cancer screening  -     [facility]- Colonoscopy      Recommendations for Preventive Services Due: see orders and patient instructions/AVS.  Recommended screening schedule for the next 5-10 years is provided to the patient in written form: see Patient Instructions/AVS.    1. Welcome to Medicare preventive visit       2. Need for prophylactic vaccination against Streptococcus pneumoniae (pneumococcus)     - Pneumococcal Conjugate PCV20, PF (Prevnar 20)    3. Need for prophylactic vaccination and inoculation against varicella  Has received 1 of 2 Shingrix. The patient and I discussed the risk and benefits of shingles vaccine. Given the patient's age I believe he would be a good candidate for this. Based on CDC recommendations, I recommend the Shingrix Vaccine for you. Healthy adults 50 years and older should get two doses of Shingrix,  by 2 to 6 months. You should get Shingrix even if in the past you received Zostavax. There is no maximum age for getting Shingrix. If you had shingles in the past, you can get Shingrix to help prevent future occurrences of the disease. There is no specific length of time that you need to wait after having shingles before you can receive Shingrix, but generally you should make sure the shingles rash has gone away before getting vaccinated. Recommended to be purchased and administered your local pharmacy         4.  Screening for HIV without presence of risk factors  USPTF recommendation   - HIV 1/2 Ag/Ab, 4TH Generation,W Rflx Confirm; Future    5. Colon cancer screening  Recent blood in stool. Resolved. Has been over ten years since last colonoscopy. Recommend colonoscopy screening. Cologuard negative 2020  The patient was counseled on the risks and benefits of colonoscopy and the importance of colon cancer screening. Major risks include bleeding (1/1000), perforation (1/1000), missed lesions, and reactions to sedation medications or bowel prep. We discussed the various alternatives which could include flexible sigmoidoscopy, contrast enema, CT colonography, and FOBT, and fecal DNA testing. I explained the risks and benefits of each of these and why I feel colonoscopy is the best test for the patient. - [facility]- Colonoscopy    6. BPH- Follow up with urology for ongoing management. No uti sx. Return in 6 months (on 12/20/2022) for Medicare Annual Wellness Visit in 1 year. Subjective   The following acute and/or chronic problems were also addressed today:  Patient is following up with Dr. Nicolas Machuca with urology in regards to his BPH and elevated PSA. MRI of the prostate or biopsy being considered with repeat PSA in a few months. Urology notes are reviewed and discussed. He was recently in [place] and developed hot tub folliculitis and was treated with Keflex. He developed some brief bloody diarrhea around that same time. This resolved. He has not had any further rectal bleeding issue or blood in his stool. No syncope or chest pain abdominal pain. He had a negative Cologuard in 2020 and has had a colonoscopy prior to this over 10 years ago. He was referred for colonoscopy in 2018 and this was delayed and then when the pandemic came along 2020 he elected for Cologuard testing.     Family History   Problem Relation Age of Onset    Alzheimer's Disease Father     Heart Disease Mother     Cancer Mother breast       Lab Results   Component Value Date    WBC 5.6 06/09/2022    HGB 14.6 06/09/2022    HCT 45.5 06/09/2022    MCV 93.2 06/09/2022    RDW 14.2 06/09/2022     06/09/2022    NEUTOPHILPCT 51 05/24/2021    LYMPHOPCT 35 06/09/2022    LYMPHOPCT 37 05/24/2021    MONOPCT 8 06/09/2022    MONOPCT 9 05/24/2021    EOSRELPCT 1 06/09/2022    BASOPCT 0 06/09/2022    BASOPCT 1 05/24/2021    LYMPHSABS 2.0 06/09/2022    LYMPHSABS 2.0 05/24/2021    MONOSABS 0.5 06/09/2022    MONOSABS 0.5 05/24/2021    EOSABS 0.1 06/09/2022    EOSABS 0.1 05/24/2021    BASOSABS 0.0 06/09/2022    IMMGRAN 0 06/09/2022    GRANULOCYTEABSOLUTECOUNT 0.0 05/24/2021       Lab Results   Component Value Date     06/09/2022    K 4.6 06/09/2022     06/09/2022    CO2 27 06/09/2022    ANIONGAP 7 06/09/2022    GLUCOSE 82 06/09/2022    BUN 16 06/09/2022    CREATININE 1.00 06/09/2022    GFRAA >60 06/09/2022    LABGLOM >60 06/09/2022    CALCIUM 9.2 06/09/2022    BILITOT 1.5 06/09/2022    ALT 25 06/09/2022    AST 16 06/09/2022    ALKPHOS 85 06/09/2022    ALKPHOS 63 05/24/2021    PROT 7.1 06/09/2022    LABALBU 4.1 06/09/2022    GLOB 3.0 06/09/2022    ALBUMIN 1.4 06/09/2022       Lab Results   Component Value Date    CHOL 240 06/09/2022    HDL 85 06/09/2022    TRIG 66 06/09/2022    LDLCALC 141.8 06/09/2022    VLDL 12 05/24/2021       No results found for: LABA1C    Lab Results   Component Value Date    TSH 1.740 05/24/2021       Lab Results   Component Value Date    PSA 5.2 03/08/2022    PSA 3.3 11/13/2020       Lab Results   Component Value Date    SPECGRAV 1.028 05/24/2021    PHUR 5.5 05/24/2021    COLORU Yellow 05/24/2021    CLARITYU Clear 05/24/2021    LEUKOCYTESUR Negative 05/24/2021    PROTEINU Negative 05/24/2021    GLUCOSEU Negative 05/24/2021    KETUA Negative 05/24/2021    BLOODU Negative 05/24/2021    BILIRUBINUR Negative 05/24/2021    UROBILINOGEN 0.2 05/24/2021    NITRU Negative 05/24/2021    LABMICR Comment 05/24/2021 Patient's complete Health Risk Assessment and screening values have been reviewed and are found in Flowsheets. The following problems were reviewed today and where indicated follow up appointments were made and/or referrals ordered.     Positive Risk Factor Screenings with Interventions:               General Health and ACP:  General  In general, how would you say your health is?: Very Good  In the past 7 days, have you experienced any of the following: New or Increased Pain, New or Increased Fatigue, Loneliness, Social Isolation, Stress or Anger?: No  Do you get the social and emotional support that you need?: Yes  Do you have a Living Will?: (!) No    Advance Directives     Power of  Living Will ACP-Advance Directive ACP-Power of     Not on File Not on File Not on File Not on File      General Health Risk Interventions:  · no issues       Hearing/Vision:  Do you or your family notice any trouble with your hearing that hasn't been managed with hearing aids?: No  Do you have difficulty driving, watching TV, or doing any of your daily activities because of your eyesight?: No  Have you had an eye exam within the past year?: (!) No   Visual Acuity Screening    Right eye Left eye Both eyes   Without correction:      With correction: 20/20 20/20 20/20     Hearing/Vision Interventions:  · no issues    Safety:  Do you have working smoke detectors?: Yes  Do you have any tripping hazards - loose or unsecured carpets or rugs?: No  Do you have any tripping hazards - clutter in doorways, halls, or stairs?: No  Do you have either shower bars, grab bars, non-slip mats or non-slip surfaces in your shower or bathtub?: (!) No  Do all of your stairways have a railing or banister?: Yes  Do you always fasten your seatbelt when you are in a car?: Yes    Safety Interventions:  · no issues           Objective   Vitals:    06/20/22 1112   BP: 118/76   Site: Left Upper Arm   Position: Sitting   Cuff Size: Small Adult Pulse: 64   Temp: 97.9 °F (36.6 °C)   TempSrc: Temporal   SpO2: 99%   Weight: 159 lb (72.1 kg)   Height: 5' 8\" (1.727 m)      Body mass index is 24.18 kg/m². General Appearance: alert and oriented to person, place and time, well developed and well- nourished, in no acute distress  Skin: warm and dry, no rash or erythema  Head: normocephalic and atraumatic  Eyes: pupils equal, round, and reactive to light, extraocular eye movements intact, conjunctivae normal  ENT: tympanic membrane, external ear and ear canal normal bilaterally, nose without deformity, nasal mucosa and turbinates normal without polyps  Neck: supple and non-tender without mass, no thyromegaly or thyroid nodules, no cervical lymphadenopathy  Pulmonary/Chest: clear to auscultation bilaterally- no wheezes, rales or rhonchi, normal air movement, no respiratory distress  Cardiovascular: normal rate, regular rhythm, normal S1 and S2, no murmurs, rubs, clicks, or gallops, distal pulses intact, no carotid bruits  Abdomen: soft, non-tender, non-distended, normal bowel sounds, no masses or organomegaly  Extremities: no cyanosis, clubbing or edema  Musculoskeletal: normal range of motion, no joint swelling, deformity or tenderness  Neurologic: reflexes normal and symmetric, no cranial nerve deficit, gait, coordination and speech normal       Not on File  Prior to Visit Medications    Medication Sig Taking?  Authorizing Provider   tadalafil (CIALIS) 5 MG tablet Take 5 mg by mouth daily  Ar Automatic Reconciliation       CareTeam (Including outside providers/suppliers regularly involved in providing care):   Patient Care Team:  Ray Martínez MD as PCP - Moris Hurtado MD as PCP - Richard Mejia Provider     Reviewed and updated this visit:  Tobacco  Meds  Problems  Med Hx  Surg Hx  Soc Hx  Fam Hx

## 2022-07-13 ENCOUNTER — OFFICE VISIT (OUTPATIENT)
Dept: UROLOGY | Age: 67
End: 2022-07-13
Payer: MEDICARE

## 2022-07-13 DIAGNOSIS — R97.20 BPH WITH ELEVATED PSA: ICD-10-CM

## 2022-07-13 DIAGNOSIS — N40.0 BPH WITH ELEVATED PSA: ICD-10-CM

## 2022-07-13 DIAGNOSIS — N40.0 BENIGN PROSTATIC HYPERPLASIA, UNSPECIFIED WHETHER LOWER URINARY TRACT SYMPTOMS PRESENT: Primary | ICD-10-CM

## 2022-07-13 LAB
BILIRUBIN, URINE, POC: NEGATIVE
BLOOD URINE, POC: NEGATIVE
GLUCOSE URINE, POC: NEGATIVE
KETONES, URINE, POC: NEGATIVE
LEUKOCYTE ESTERASE, URINE, POC: NEGATIVE
NITRITE, URINE, POC: NEGATIVE
PH, URINE, POC: 6 (ref 4.6–8)
PROTEIN,URINE, POC: NEGATIVE
SPECIFIC GRAVITY, URINE, POC: 1.02 (ref 1–1.03)
URINALYSIS CLARITY, POC: NORMAL
URINALYSIS COLOR, POC: NORMAL
UROBILINOGEN, POC: NORMAL

## 2022-07-13 PROCEDURE — 1036F TOBACCO NON-USER: CPT | Performed by: UROLOGY

## 2022-07-13 PROCEDURE — 81003 URINALYSIS AUTO W/O SCOPE: CPT | Performed by: UROLOGY

## 2022-07-13 PROCEDURE — 99213 OFFICE O/P EST LOW 20 MIN: CPT | Performed by: UROLOGY

## 2022-07-13 PROCEDURE — 1123F ACP DISCUSS/DSCN MKR DOCD: CPT | Performed by: UROLOGY

## 2022-07-13 PROCEDURE — G8420 CALC BMI NORM PARAMETERS: HCPCS | Performed by: UROLOGY

## 2022-07-13 PROCEDURE — G8427 DOCREV CUR MEDS BY ELIG CLIN: HCPCS | Performed by: UROLOGY

## 2022-07-13 PROCEDURE — 3017F COLORECTAL CA SCREEN DOC REV: CPT | Performed by: UROLOGY

## 2022-07-13 ASSESSMENT — ENCOUNTER SYMPTOMS
NAUSEA: 0
BACK PAIN: 0

## 2022-07-13 NOTE — PROGRESS NOTES
[facility]  [facility] [place]  [phone]    Kirstie Radford  : 1955    Chief Complaint   Patient presents with    Follow-up        HPI     Kirstie Radford is a 73 y.o. male  with elevated PSA, BPH/LUTS. PSA had been stable but went up to 5.2 in 3-. PSA was 2.6 in , 3.2 in 2018, 3.3 in 2019, 3.3. in . PSA was 4.34 in  at work physical.   He has had BPH/LUTS for years. Has weak stream. Has had several episodes where he thought he might not be able to void. Takes Cialis 5 mg daily. , he thinks that he mainly needs it for his LUTS. ED is not much of a problem. Likes Cialis better than tamsulosin. No family history of prostate cancer. JENNIFER showed 3+ prostate without nodules. No past medical history on file. Past Surgical History:   Procedure Laterality Date    HERNIA REPAIR      OTHER SURGICAL HISTORY      Hydrocell     Current Outpatient Medications   Medication Sig Dispense Refill    tadalafil (CIALIS) 5 MG tablet Take 5 mg by mouth daily       No current facility-administered medications for this visit. No Known Allergies  Social History     Socioeconomic History    Marital status:      Spouse name: Not on file    Number of children: Not on file    Years of education: Not on file    Highest education level: Not on file   Occupational History    Not on file   Tobacco Use    Smoking status: Never Smoker    Smokeless tobacco: Never Used   Substance and Sexual Activity    Alcohol use:  Yes    Drug use: No    Sexual activity: Not on file   Other Topics Concern    Not on file   Social History Narrative    Not on file     Social Determinants of Health     Financial Resource Strain:     Difficulty of Paying Living Expenses: Not on file   Food Insecurity:     Worried About Running Out of Food in the Last Year: Not on file    Ran Out of Food in the Last Year: Not on file   Transportation Needs:     Lack of Transportation (Medical): Not on file    Lack of Transportation (Non-Medical): Not on file   Physical Activity: Sufficiently Active    Days of Exercise per Week: 4 days    Minutes of Exercise per Session: 80 min   Stress:     Feeling of Stress : Not on file   Social Connections:     Frequency of Communication with Friends and Family: Not on file    Frequency of Social Gatherings with Friends and Family: Not on file    Attends Scientologist Services: Not on file    Active Member of [address] [place] [address] [employer] or Organizations: Not on file    Attends Club or Organization Meetings: Not on file    Marital Status: Not on file   Intimate Partner Violence:     Fear of Current or Ex-Partner: Not on file    Emotionally Abused: Not on file    Physically Abused: Not on file    Sexually Abused: Not on file   Housing Stability:     Unable to Pay for Housing in the Last Year: Not on file    Number of [place] in the Last Year: Not on file    Unstable Housing in the Last Year: Not on file     Family History   Problem Relation Age of Onset    Alzheimer's Disease Father     Heart Disease Mother     Cancer Mother         breast       Review of Systems  Constitutional:   Negative for fever. Cardiovascular:  Negative for chest pain. GI:  Negative for nausea. Genitourinary:  Negative for urinary burning. Musculoskeletal:  Negative for back pain. There were no vitals taken for this visit.     Urinalysis  UA - Dipstick  Results for orders placed or performed in visit on 07/13/22   AMB POC URINALYSIS DIP STICK AUTO W/O MICRO   Result Value Ref Range    Color (UA POC)      Clarity (UA POC)      Glucose, Urine, POC Negative Negative    Bilirubin, Urine, POC Negative Negative    KETONES, Urine, POC Negative Negative    Specific Gravity, Urine, POC 1.020 1.001 - 1.035    Blood (UA POC) Negative Negative    pH, Urine, POC 6.0 4.6 - 8.0    Protein, Urine, POC Negative Negative    Urobilinogen, POC Normal     Nitrite, Urine, POC Negative Negative    Leukocyte Esterase, Urine, POC Negative Negative   Results for orders placed or performed in visit on 11/13/20   AMB POC URINALYSIS DIP STICK AUTO W/O MICRO   Result Value Ref Range    Color (UA POC) Yellow     Clarity (UA POC) Clear     Glucose, Urine, POC Negative Negative    Bilirubin, Urine, POC Negative Negative    KETONES, Urine, POC Negative Negative    Specific Gravity, Urine, POC 1.025 1.001 - 1.035 NA    Blood (UA POC) Negative Negative    pH, Urine, POC 6.0 4.6 - 8.0 NA    Protein, Urine, POC Negative Negative    Urobilinogen, POC 0.2 mg/dL 0.2 - 1    Nitrite, Urine, POC Negative Negative    Leukocyte Esterase, Urine, POC Negative Negative       UA - Micro  WBC - 0  RBC - 0  Bacteria - 0  Epith - 0    Physical Exam    Assessment and Plan    ICD-10-CM    1. Benign prostatic hyperplasia, unspecified whether lower urinary tract symptoms present  N40.0 AMB POC URINALYSIS DIP STICK AUTO W/O MICRO     PSA, Total and Free   2. BPH with elevated PSA  N40.0     R97.20        Orders Placed This Encounter   Procedures    PSA, Total and Free     Standing Status:   Future     Standing Expiration Date:   7/13/2023    AMB POC URINALYSIS DIP STICK AUTO W/O MICRO     PSA II today. Call patient with results  May need MRI. Continues daily Cialis 5 mg. Follow-up and Dispositions    · Return for call patient to arrange follow-up.

## 2022-07-14 LAB
PSA FREE MFR SERPL: 24.3 %
PSA FREE SERPL-MCNC: 0.9 NG/ML
PSA SERPL-MCNC: 3.7 NG/ML

## 2022-07-19 ENCOUNTER — TELEPHONE (OUTPATIENT)
Dept: UROLOGY | Age: 67
End: 2022-07-19

## 2022-07-25 RX ORDER — TADALAFIL 5 MG/1
5 TABLET ORAL DAILY
Qty: 30 TABLET | Refills: 5 | Status: SHIPPED | OUTPATIENT
Start: 2022-07-25

## 2022-07-25 RX ORDER — TADALAFIL 5 MG/1
TABLET ORAL
Qty: 30 TABLET | Refills: 2 | OUTPATIENT
Start: 2022-07-25

## 2022-07-25 NOTE — TELEPHONE ENCOUNTER
Pt called and LVM stating Rx was not sent at [address] [place] [address]  and is requesting it be sent in to [facility]. Are you able to send this in since [facility] is out of office for the week?

## 2022-12-13 ENCOUNTER — OFFICE VISIT (OUTPATIENT)
Dept: INTERNAL MEDICINE CLINIC | Facility: CLINIC | Age: 67
End: 2022-12-13
Payer: MEDICARE

## 2022-12-13 VITALS
HEART RATE: 57 BPM | WEIGHT: 163 LBS | TEMPERATURE: 97.3 F | DIASTOLIC BLOOD PRESSURE: 70 MMHG | BODY MASS INDEX: 24.71 KG/M2 | HEIGHT: 68 IN | SYSTOLIC BLOOD PRESSURE: 122 MMHG | OXYGEN SATURATION: 95 %

## 2022-12-13 DIAGNOSIS — Z00.00 LABORATORY EXAM ORDERED AS PART OF ROUTINE GENERAL MEDICAL EXAMINATION: ICD-10-CM

## 2022-12-13 DIAGNOSIS — N40.0 BENIGN PROSTATIC HYPERPLASIA WITHOUT LOWER URINARY TRACT SYMPTOMS: ICD-10-CM

## 2022-12-13 DIAGNOSIS — Z12.5 ENCOUNTER FOR PROSTATE CANCER SCREENING: ICD-10-CM

## 2022-12-13 DIAGNOSIS — K57.90 DIVERTICULOSIS: Primary | ICD-10-CM

## 2022-12-13 PROCEDURE — G8427 DOCREV CUR MEDS BY ELIG CLIN: HCPCS | Performed by: INTERNAL MEDICINE

## 2022-12-13 PROCEDURE — 1036F TOBACCO NON-USER: CPT | Performed by: INTERNAL MEDICINE

## 2022-12-13 PROCEDURE — 99213 OFFICE O/P EST LOW 20 MIN: CPT | Performed by: INTERNAL MEDICINE

## 2022-12-13 PROCEDURE — G8484 FLU IMMUNIZE NO ADMIN: HCPCS | Performed by: INTERNAL MEDICINE

## 2022-12-13 PROCEDURE — G8420 CALC BMI NORM PARAMETERS: HCPCS | Performed by: INTERNAL MEDICINE

## 2022-12-13 PROCEDURE — 1123F ACP DISCUSS/DSCN MKR DOCD: CPT | Performed by: INTERNAL MEDICINE

## 2022-12-13 PROCEDURE — 3017F COLORECTAL CA SCREEN DOC REV: CPT | Performed by: INTERNAL MEDICINE

## 2022-12-13 ASSESSMENT — ENCOUNTER SYMPTOMS
ANAL BLEEDING: 0
ABDOMINAL PAIN: 0

## 2022-12-13 NOTE — PROGRESS NOTES
ASSESSMENT/PLAN:    Colonoscopy with diverticulosis. Increase fiber. Follow-up colonoscopy in 10 years. PSA is being monitored by urology. PSA decreased. No voiding issues at this time. Appreciate Dr. Jennifer Bell assistance. Labs in 6 months with annual wellness visit. Evaluation and management of the chronic condition(s) delineated. No negative side effects reported. I have reviewed all the lab results. There are some abnormalities that are either expected or not critical to the patient's health, and are discussed in the office today and are addressed. Please refer to the above assessement and plan narrative and orders and follow up plan. Medication discussed and refilled as needed. Physical exam findings are stable unless otherwise indicated and this is addressed. The most recent lab work and imaging and consultant/urgent care visits and imaging are reviewed and discussed and considered during this visit encounter. 1. Diverticulosis  2. Laboratory exam ordered as part of routine general medical examination  -     CBC; Future  -     Comprehensive Metabolic Panel; Future  -     Hemoglobin A1C; Future  -     Lipid Panel; Future  -     TSH; Future  -     Urinalysis; Future  -     PSA Screening; Future  3. Benign prostatic hyperplasia without lower urinary tract symptoms  4. Encounter for prostate cancer screening  -     PSA Screening; Future       On this date, 22, I have spent 30 minutes reviewing previous notes, test results and face to face with the patient discussing the diagnosis and importance of compliance with the treatment plan as well as documenting on the day of the visit. An electronic signature was used to authenticate this note. -- William Torrez MD     Return in about 6 months (around 2023).     SUBJECTIVE/OBJECTIVE:  Chief Complaint   Patient presents with    6 Month Follow-Up     Recheck after consult with Urology       HPI:   Herminia Richardson (: 1955 is a 78 y.o. male, here for evaluation of the following chief complaint(s):   Chief Complaint   Patient presents with    Charan after consult with Urology       Patient is here for follow-up and management of chronic medical conditions, review of recent labs, review of any imaging completed since our last office visit and discuss any consultants opinions or management changes. Labs reviewed and discussed and medication refilled as needed for chronic medications during ov or adjusted based on lab results and/or our discussion as appropriate. See discussion. The patient's available records and electronic chart records are reviewed. The PMH, PSH, medications, allergies, medications, FH, health maintenance and vaccination status are all reviewed and updated as appropriate. Records from outside providers have been reviewed, summarized, and considered as noted in the history of present illness, past medical history, and objective data of this note and encounter. Health Maintenance   Topic Date Due    Shingles vaccine (2 of 2) 05/09/2022    COVID-19 Vaccine (4 - Booster for Pfizer series) 07/20/2022    Depression Screen  06/20/2023    Annual Wellness Visit (AWV)  06/21/2023    DTaP/Tdap/Td vaccine (2 - Td or Tdap) 12/10/2026    Lipids  06/09/2027    Colorectal Cancer Screen  10/03/2032    Flu vaccine  Completed    Pneumococcal 65+ years Vaccine  Completed    Hepatitis C screen  Completed    Hepatitis A vaccine  Aged Out    Hib vaccine  Aged Out    Meningococcal (ACWY) vaccine  Aged Out     Patient Active Problem List   Diagnosis    BPH (benign prostatic hyperplasia)    Degenerative disc disease, cervical    Neck pain       Review of Systems   HENT:          Being evaluated for dental implant   Gastrointestinal:  Negative for abdominal pain and anal bleeding. Genitourinary:  Negative for difficulty urinating and hematuria.      Lab Results   Component Value Date/Time    WBC 5.6 06/09/2022 04:23 PM    HGB 14.6 06/09/2022 04:23 PM    HCT 45.5 06/09/2022 04:23 PM    MCV 93.2 06/09/2022 04:23 PM    RDW 14.2 06/09/2022 04:23 PM     06/09/2022 04:23 PM    NEUTOPHILPCT 51 05/24/2021 08:22 AM    LYMPHOPCT 35 06/09/2022 04:23 PM    LYMPHOPCT 37 05/24/2021 08:22 AM    MONOPCT 8 06/09/2022 04:23 PM    MONOPCT 9 05/24/2021 08:22 AM    EOSRELPCT 1 06/09/2022 04:23 PM    BASOPCT 0 06/09/2022 04:23 PM    BASOPCT 1 05/24/2021 08:22 AM    LYMPHSABS 2.0 06/09/2022 04:23 PM    LYMPHSABS 2.0 05/24/2021 08:22 AM    MONOSABS 0.5 06/09/2022 04:23 PM    MONOSABS 0.5 05/24/2021 08:22 AM    EOSABS 0.1 06/09/2022 04:23 PM    EOSABS 0.1 05/24/2021 08:22 AM    BASOSABS 0.0 06/09/2022 04:23 PM    IMMGRAN 0 06/09/2022 04:23 PM    GRANULOCYTEABSOLUTECOUNT 0.0 05/24/2021 08:22 AM       Lab Results   Component Value Date/Time     06/09/2022 04:23 PM    K 4.6 06/09/2022 04:23 PM     06/09/2022 04:23 PM    CO2 27 06/09/2022 04:23 PM    ANIONGAP 7 06/09/2022 04:23 PM    GLUCOSE 82 06/09/2022 04:23 PM    BUN 16 06/09/2022 04:23 PM    CREATININE 1.00 06/09/2022 04:23 PM    GFRAA >60 06/09/2022 04:23 PM    LABGLOM >60 06/09/2022 04:23 PM    CALCIUM 9.2 06/09/2022 04:23 PM    BILITOT 1.5 06/09/2022 04:23 PM    ALT 25 06/09/2022 04:23 PM    AST 16 06/09/2022 04:23 PM    ALKPHOS 85 06/09/2022 04:23 PM    ALKPHOS 63 05/24/2021 08:22 AM    PROT 7.1 06/09/2022 04:23 PM    LABALBU 4.1 06/09/2022 04:23 PM    GLOB 3.0 06/09/2022 04:23 PM    ALBUMIN 1.4 06/09/2022 04:23 PM       Lab Results   Component Value Date/Time    CHOL 240 06/09/2022 04:23 PM    HDL 85 06/09/2022 04:23 PM    TRIG 66 06/09/2022 04:23 PM    LDLCALC 141.8 06/09/2022 04:23 PM    VLDL 12 05/24/2021 08:22 AM       No results found for: LABA1C    Lab Results   Component Value Date/Time    TSH 1.740 05/24/2021 08:22 AM       Lab Results   Component Value Date/Time    PSA 3.7 07/13/2022 12:03 PM    PSA 5.2 03/08/2022 09:54 AM    PSA 3.3 11/13/2020 10:09 AM       Lab Results   Component Value Date/Time    SPECGRAV 1.028 05/24/2021 08:22 AM    PHUR 5.5 05/24/2021 08:22 AM    COLORU Yellow 05/24/2021 08:22 AM    CLARITYU Clear 05/24/2021 08:22 AM    LEUKOCYTESUR Negative 05/24/2021 08:22 AM    PROTEINU Negative 05/24/2021 08:22 AM    GLUCOSEU Negative 05/24/2021 08:22 AM    KETUA Negative 05/24/2021 08:22 AM    BLOODU Negative 05/24/2021 08:22 AM    BILIRUBINUR Negative 05/24/2021 08:22 AM    UROBILINOGEN 0.2 05/24/2021 08:22 AM    NITRU Negative 05/24/2021 08:22 AM    LABMICR Comment 05/24/2021 08:22 AM             Vitals:    12/13/22 0828   BP: 122/70   Site: Left Upper Arm   Position: Sitting   Cuff Size: Small Adult   Pulse: 57   Temp: 97.3 °F (36.3 °C)   TempSrc: Skin   SpO2: 95%   Weight: 163 lb (73.9 kg)   Height: 5' 8\" (1.727 m)     Wt Readings from Last 3 Encounters:   12/13/22 163 lb (73.9 kg)   06/20/22 159 lb (72.1 kg)   02/11/22 162 lb 12.8 oz (73.8 kg)     BP Readings from Last 3 Encounters:   12/13/22 122/70   06/20/22 118/76   02/11/22 124/72     Physical Exam  Vitals and nursing note reviewed. Constitutional:       Appearance: Normal appearance. He is not ill-appearing. HENT:      Head: Normocephalic and atraumatic. Eyes:      Extraocular Movements: Extraocular movements intact. Conjunctiva/sclera: Conjunctivae normal.   Cardiovascular:      Rate and Rhythm: Normal rate. Pulmonary:      Effort: Pulmonary effort is normal.   Neurological:      General: No focal deficit present. Mental Status: He is alert and oriented to person, place, and time. Mental status is at baseline.    Psychiatric:         Mood and Affect: Mood normal.         Behavior: Behavior normal.

## 2023-02-13 ENCOUNTER — HOSPITAL ENCOUNTER (EMERGENCY)
Age: 68
Discharge: HOME | End: 2023-02-13
Attending: EMERGENCY MEDICINE
Payer: MEDICARE

## 2023-02-13 VITALS
WEIGHT: 155 LBS | SYSTOLIC BLOOD PRESSURE: 130 MMHG | BODY MASS INDEX: 22.19 KG/M2 | DIASTOLIC BLOOD PRESSURE: 81 MMHG | TEMPERATURE: 98.2 F | HEART RATE: 71 BPM | HEIGHT: 70 IN | RESPIRATION RATE: 18 BRPM | OXYGEN SATURATION: 97 %

## 2023-02-13 DIAGNOSIS — T78.40XA ALLERGIC REACTION, INITIAL ENCOUNTER: Primary | ICD-10-CM

## 2023-02-13 PROCEDURE — 99284 EMERGENCY DEPT VISIT MOD MDM: CPT

## 2023-02-13 PROCEDURE — 6360000002 HC RX W HCPCS

## 2023-02-13 PROCEDURE — 96372 THER/PROPH/DIAG INJ SC/IM: CPT

## 2023-02-13 RX ORDER — DEXAMETHASONE SODIUM PHOSPHATE 10 MG/ML
8 INJECTION, SOLUTION INTRAMUSCULAR; INTRAVENOUS
Status: COMPLETED | OUTPATIENT
Start: 2023-02-13 | End: 2023-02-13

## 2023-02-13 RX ADMIN — DEXAMETHASONE SODIUM PHOSPHATE 8 MG: 10 INJECTION INTRAMUSCULAR; INTRAVENOUS at 14:31

## 2023-02-13 ASSESSMENT — ENCOUNTER SYMPTOMS
VOMITING: 0
CHEST TIGHTNESS: 0
SHORTNESS OF BREATH: 0
COLOR CHANGE: 0
DIARRHEA: 0
WHEEZING: 0
ABDOMINAL PAIN: 0
NAUSEA: 0

## 2023-02-13 ASSESSMENT — LIFESTYLE VARIABLES
HOW OFTEN DO YOU HAVE A DRINK CONTAINING ALCOHOL: 4 OR MORE TIMES A WEEK
HOW MANY STANDARD DRINKS CONTAINING ALCOHOL DO YOU HAVE ON A TYPICAL DAY: 1 OR 2

## 2023-02-13 ASSESSMENT — PAIN - FUNCTIONAL ASSESSMENT
PAIN_FUNCTIONAL_ASSESSMENT: NONE - DENIES PAIN
PAIN_FUNCTIONAL_ASSESSMENT: 0-10
PAIN_FUNCTIONAL_ASSESSMENT: NONE - DENIES PAIN

## 2023-02-13 ASSESSMENT — PAIN SCALES - GENERAL
PAINLEVEL_OUTOF10: 0
PAINLEVEL_OUTOF10: 0

## 2023-02-13 NOTE — DISCHARGE INSTRUCTIONS
You were evaluated today with a possible allergen exposure. Your vital signs today were normal. You were given a steroid shot to further facilitate the resolution of your reaction. Seems likely related to the flaxseed from earlier. I would plan to follow-up with your primary care physician. Please return to the emergency department with any worsening symptoms or concerns in the interim.

## 2023-02-13 NOTE — ED NOTES
Pt and family notified of intended use and side effects of medications given. Opportunity given to ask questions.         Tiffani Dumont RN  02/13/23 [id number]

## 2023-02-13 NOTE — ED TRIAGE NOTES
Report drinking a smoothie today, only thing different today was adding flaxseed shortly afterward pt developed, hives/welts to generalized body and itching.  He took one benadryl and one pepcid before coming in,

## 2023-02-13 NOTE — ED PROVIDER NOTES
Emergency Department Provider Note                   PCP:                Rosa Pabon MD               Age: 78 y.o. Sex: male       ICD-10-CM    1. Allergic reaction, initial encounter  T78.40XA           DISPOSITION Decision To Discharge 02/13/2023 03:03:34 PM        Medical Decision Making  81-year-old male with no stated medical history presenting for evaluation after a possible allergen exposure to flaxseed. States was drinking a smoothie with flaxseed and developed diffuse urticaria approximately 15-20 minutes afterward. Denies other possible exposures. No dyspnea or dysphagia. Did take a Pepcid and Benadryl PTA. Patient's vitals stable. Exam with numerous resolving urticaria on the face and trunk. No pharyngeal soft tissue edema. Well-appearing. Reaction appears to be resolving. We will add dexamethasone IM to further facilitate this. Patient states he is feeling much better upon reevaluation and wishes to be discharged. Advised follow-up with primary care. Patient verbalizes understanding and is agreeable to this plan. Risk  Prescription drug management. Complexity of Problem: 1 acute, uncomplicated illness or injury. (3)                No orders of the defined types were placed in this encounter. Medications   dexamethasone (PF) (DECADRON) injection 8 mg (8 mg IntraMUSCular Given 2/13/23 5539)       New Prescriptions    No medications on file        Ebony Jim is a 78 y.o. male who presents to the Emergency Department with chief complaint of    Chief Complaint   Patient presents with    Allergic Reaction      Patient is a 81-year-old male with no stated medical history presenting to the emergency department for evaluation of a possible allergic reaction. Patient states he was drinking a smoothie with flaxseed earlier today which is the only dietary change out of the ordinary for his routine.  States that he regularly drinks smoothies, however, today was the first time his wife added flaxseed. States that after consuming the smoothie, he began developing urticaria over his face and trunk. Denies any shortness of breath or difficulty swallowing. Denies any other possible exposures. States that he took a Benadryl and Pepcid approximately 1 hour PTA which has been helping. He has no other complaints today. The history is provided by the patient. Review of Systems   Constitutional:  Negative for chills, fatigue and fever. Eyes:  Negative for visual disturbance. Respiratory:  Negative for chest tightness, shortness of breath and wheezing. Cardiovascular:  Negative for chest pain. Gastrointestinal:  Negative for abdominal pain, diarrhea, nausea and vomiting. Genitourinary:  Negative for dysuria. Musculoskeletal:  Negative for arthralgias and myalgias. Skin:  Positive for rash. Negative for color change. Neurological:  Negative for dizziness, syncope, weakness, light-headedness and headaches. All other systems reviewed and are negative. No past medical history on file. Past Surgical History:   Procedure Laterality Date    HERNIA REPAIR      OTHER SURGICAL HISTORY      Hydrocell        Family History   Problem Relation Age of Onset    Alzheimer's Disease Father     Heart Disease Mother     Cancer Mother         breast        Social History     Socioeconomic History    Marital status:    Tobacco Use    Smoking status: Never    Smokeless tobacco: Never   Vaping Use    Vaping Use: Never used   Substance and Sexual Activity    Alcohol use: Yes    Drug use: No     Social Determinants of Health     Physical Activity: Sufficiently Active    Days of Exercise per Week: 4 days    Minutes of Exercise per Session: 80 min         Flaxseed     Previous Medications    TADALAFIL (CIALIS) 5 MG TABLET    Take 1 tablet by mouth in the morning. Vitals signs and nursing note reviewed.    Patient Vitals for the past 4 hrs:   Temp Pulse Resp BP SpO2   02/13/23 1520 98.2 °F (36.8 °C) 71 18 130/81 97 %   02/13/23 1408 -- 70 19 126/89 99 %   02/13/23 1340 98.1 °F (36.7 °C) 71 19 (!) 149/87 98 %          Physical Exam  Vitals and nursing note reviewed. Constitutional:       General: He is not in acute distress. Appearance: Normal appearance. He is not ill-appearing or diaphoretic. HENT:      Head: Normocephalic and atraumatic. Right Ear: External ear normal.      Left Ear: External ear normal.      Nose: Nose normal.   Eyes:      General: No scleral icterus. Right eye: No discharge. Left eye: No discharge. Extraocular Movements: Extraocular movements intact. Conjunctiva/sclera: Conjunctivae normal.   Cardiovascular:      Rate and Rhythm: Normal rate and regular rhythm. Pulses: Normal pulses. Heart sounds: Normal heart sounds. Pulmonary:      Effort: Pulmonary effort is normal.      Breath sounds: Normal breath sounds. Abdominal:      General: Abdomen is flat. Palpations: Abdomen is soft. Tenderness: There is no abdominal tenderness. Musculoskeletal:         General: Normal range of motion. Cervical back: Normal range of motion and neck supple. Skin:     General: Skin is warm and dry. Findings: Rash present. Comments: Numerous resolving urticaria on the bilateral face and trunk. Neurological:      General: No focal deficit present. Mental Status: He is alert and oriented to person, place, and time. Psychiatric:         Mood and Affect: Mood normal.         Behavior: Behavior normal.        Procedures    No results found for any visits on 02/13/23. No orders to display                       Voice dictation software was used during the making of this note. This software is not perfect and grammatical and other typographical errors may be present. This note has not been completely proofread for errors.       Milagros Ramirezma  02/13/23 [id number]

## 2023-03-02 RX ORDER — TADALAFIL 5 MG/1
5 TABLET ORAL DAILY
Qty: 30 TABLET | Refills: 5 | Status: SHIPPED | OUTPATIENT
Start: 2023-03-02

## 2023-03-07 ENCOUNTER — TELEPHONE (OUTPATIENT)
Dept: UROLOGY | Age: 68
End: 2023-03-07

## 2023-03-07 DIAGNOSIS — N40.0 BENIGN PROSTATIC HYPERPLASIA, UNSPECIFIED WHETHER LOWER URINARY TRACT SYMPTOMS PRESENT: Primary | ICD-10-CM

## 2023-03-07 RX ORDER — TADALAFIL 5 MG/1
5 TABLET ORAL DAILY
Qty: 30 TABLET | Refills: 12 | Status: SHIPPED | OUTPATIENT
Start: 2023-03-07

## 2023-03-07 NOTE — TELEPHONE ENCOUNTER
----- Message from Darby Ramirez sent at 3/2/2023  8:13 AM EST -----  Regarding: FW: Please fill prescription request     ----- Message -----  From: Elle Saba  Sent: 3/1/2023  10:30 PM EST  To: , #  Subject: Please fill prescription request                 Tantifil 5 mg

## 2023-03-07 NOTE — TELEPHONE ENCOUNTER
Diagnosis Orders   1. Benign prostatic hyperplasia, unspecified whether lower urinary tract symptoms present  tadalafil (CIALIS) 5 MG tablet        I eprescribed the med

## 2023-06-15 DIAGNOSIS — Z12.5 ENCOUNTER FOR PROSTATE CANCER SCREENING: ICD-10-CM

## 2023-06-15 DIAGNOSIS — Z00.00 LABORATORY EXAM ORDERED AS PART OF ROUTINE GENERAL MEDICAL EXAMINATION: ICD-10-CM

## 2023-06-15 LAB
ALBUMIN SERPL-MCNC: 3.9 G/DL (ref 3.2–4.6)
ALBUMIN/GLOB SERPL: 1.3 (ref 0.4–1.6)
ALP SERPL-CCNC: 69 U/L (ref 50–136)
ALT SERPL-CCNC: 22 U/L (ref 12–65)
ANION GAP SERPL CALC-SCNC: 6 MMOL/L (ref 2–11)
AST SERPL-CCNC: 17 U/L (ref 15–37)
BASOPHILS # BLD: 0 K/UL (ref 0–0.2)
BASOPHILS NFR BLD: 0 % (ref 0–2)
BILIRUB SERPL-MCNC: 1.5 MG/DL (ref 0.2–1.1)
BUN SERPL-MCNC: 19 MG/DL (ref 8–23)
CALCIUM SERPL-MCNC: 8.7 MG/DL (ref 8.3–10.4)
CHLORIDE SERPL-SCNC: 107 MMOL/L (ref 101–110)
CHOLEST SERPL-MCNC: 223 MG/DL
CO2 SERPL-SCNC: 28 MMOL/L (ref 21–32)
CREAT SERPL-MCNC: 1.1 MG/DL (ref 0.8–1.5)
DIFFERENTIAL METHOD BLD: NORMAL
EOSINOPHIL # BLD: 0.2 K/UL (ref 0–0.8)
EOSINOPHIL NFR BLD: 4 % (ref 0.5–7.8)
ERYTHROCYTE [DISTWIDTH] IN BLOOD BY AUTOMATED COUNT: 13.7 % (ref 11.9–14.6)
EST. AVERAGE GLUCOSE BLD GHB EST-MCNC: 111 MG/DL
GLOBULIN SER CALC-MCNC: 3 G/DL (ref 2.8–4.5)
GLUCOSE SERPL-MCNC: 102 MG/DL (ref 65–100)
HBA1C MFR BLD: 5.5 % (ref 4.8–5.6)
HCT VFR BLD AUTO: 43.8 % (ref 41.1–50.3)
HDLC SERPL-MCNC: 102 MG/DL (ref 40–60)
HDLC SERPL: 2.2
HGB BLD-MCNC: 14.4 G/DL (ref 13.6–17.2)
IMM GRANULOCYTES # BLD AUTO: 0 K/UL (ref 0–0.5)
IMM GRANULOCYTES NFR BLD AUTO: 0 % (ref 0–5)
LDLC SERPL CALC-MCNC: 108.2 MG/DL
LYMPHOCYTES # BLD: 2 K/UL (ref 0.5–4.6)
LYMPHOCYTES NFR BLD: 37 % (ref 13–44)
MCH RBC QN AUTO: 29.8 PG (ref 26.1–32.9)
MCHC RBC AUTO-ENTMCNC: 32.9 G/DL (ref 31.4–35)
MCV RBC AUTO: 90.7 FL (ref 82–102)
MONOCYTES # BLD: 0.5 K/UL (ref 0.1–1.3)
MONOCYTES NFR BLD: 9 % (ref 4–12)
NEUTS SEG # BLD: 2.6 K/UL (ref 1.7–8.2)
NEUTS SEG NFR BLD: 50 % (ref 43–78)
NRBC # BLD: 0 K/UL (ref 0–0.2)
PLATELET # BLD AUTO: 247 K/UL (ref 150–450)
PMV BLD AUTO: 9.9 FL (ref 9.4–12.3)
POTASSIUM SERPL-SCNC: 4.4 MMOL/L (ref 3.5–5.1)
PROT SERPL-MCNC: 6.9 G/DL (ref 6.3–8.2)
PSA SERPL-MCNC: 4.1 NG/ML
RBC # BLD AUTO: 4.83 M/UL (ref 4.23–5.6)
SODIUM SERPL-SCNC: 141 MMOL/L (ref 133–143)
TRIGL SERPL-MCNC: 64 MG/DL (ref 35–150)
TSH, 3RD GENERATION: 1.59 UIU/ML (ref 0.36–3.74)
VLDLC SERPL CALC-MCNC: 12.8 MG/DL (ref 6–23)
WBC # BLD AUTO: 5.2 K/UL (ref 4.3–11.1)

## 2023-06-20 SDOH — ECONOMIC STABILITY: HOUSING INSECURITY
IN THE LAST 12 MONTHS, WAS THERE A TIME WHEN YOU DID NOT HAVE A STEADY PLACE TO SLEEP OR SLEPT IN A SHELTER (INCLUDING NOW)?: NO

## 2023-06-20 SDOH — ECONOMIC STABILITY: TRANSPORTATION INSECURITY
IN THE PAST 12 MONTHS, HAS LACK OF TRANSPORTATION KEPT YOU FROM MEETINGS, WORK, OR FROM GETTING THINGS NEEDED FOR DAILY LIVING?: NO

## 2023-06-20 SDOH — HEALTH STABILITY: PHYSICAL HEALTH: ON AVERAGE, HOW MANY DAYS PER WEEK DO YOU ENGAGE IN MODERATE TO STRENUOUS EXERCISE (LIKE A BRISK WALK)?: 4 DAYS

## 2023-06-20 SDOH — ECONOMIC STABILITY: FOOD INSECURITY: WITHIN THE PAST 12 MONTHS, YOU WORRIED THAT YOUR FOOD WOULD RUN OUT BEFORE YOU GOT MONEY TO BUY MORE.: NEVER TRUE

## 2023-06-20 SDOH — ECONOMIC STABILITY: INCOME INSECURITY: HOW HARD IS IT FOR YOU TO PAY FOR THE VERY BASICS LIKE FOOD, HOUSING, MEDICAL CARE, AND HEATING?: NOT HARD AT ALL

## 2023-06-20 SDOH — HEALTH STABILITY: PHYSICAL HEALTH: ON AVERAGE, HOW MANY MINUTES DO YOU ENGAGE IN EXERCISE AT THIS LEVEL?: 70 MIN

## 2023-06-20 SDOH — ECONOMIC STABILITY: FOOD INSECURITY: WITHIN THE PAST 12 MONTHS, THE FOOD YOU BOUGHT JUST DIDN'T LAST AND YOU DIDN'T HAVE MONEY TO GET MORE.: NEVER TRUE

## 2023-06-20 ASSESSMENT — LIFESTYLE VARIABLES
HOW OFTEN DO YOU HAVE A DRINK CONTAINING ALCOHOL: 2-3 TIMES A WEEK
HOW OFTEN DO YOU HAVE SIX OR MORE DRINKS ON ONE OCCASION: 1
HOW OFTEN DO YOU HAVE A DRINK CONTAINING ALCOHOL: 4
HOW MANY STANDARD DRINKS CONTAINING ALCOHOL DO YOU HAVE ON A TYPICAL DAY: 1 OR 2
HOW MANY STANDARD DRINKS CONTAINING ALCOHOL DO YOU HAVE ON A TYPICAL DAY: 1

## 2023-06-20 ASSESSMENT — PATIENT HEALTH QUESTIONNAIRE - PHQ9
SUM OF ALL RESPONSES TO PHQ QUESTIONS 1-9: 0
SUM OF ALL RESPONSES TO PHQ9 QUESTIONS 1 & 2: 0
SUM OF ALL RESPONSES TO PHQ QUESTIONS 1-9: 0
1. LITTLE INTEREST OR PLEASURE IN DOING THINGS: 0
2. FEELING DOWN, DEPRESSED OR HOPELESS: 0

## 2023-06-22 ENCOUNTER — OFFICE VISIT (OUTPATIENT)
Dept: INTERNAL MEDICINE CLINIC | Facility: CLINIC | Age: 68
End: 2023-06-22
Payer: MEDICARE

## 2023-06-22 VITALS
HEIGHT: 68 IN | TEMPERATURE: 97.5 F | BODY MASS INDEX: 24.1 KG/M2 | DIASTOLIC BLOOD PRESSURE: 90 MMHG | WEIGHT: 159 LBS | SYSTOLIC BLOOD PRESSURE: 132 MMHG | OXYGEN SATURATION: 100 % | HEART RATE: 63 BPM

## 2023-06-22 DIAGNOSIS — N40.0 BENIGN PROSTATIC HYPERPLASIA, UNSPECIFIED WHETHER LOWER URINARY TRACT SYMPTOMS PRESENT: ICD-10-CM

## 2023-06-22 DIAGNOSIS — Z00.00 MEDICARE ANNUAL WELLNESS VISIT, SUBSEQUENT: Primary | ICD-10-CM

## 2023-06-22 PROCEDURE — 3017F COLORECTAL CA SCREEN DOC REV: CPT | Performed by: INTERNAL MEDICINE

## 2023-06-22 PROCEDURE — G0439 PPPS, SUBSEQ VISIT: HCPCS | Performed by: INTERNAL MEDICINE

## 2023-06-22 PROCEDURE — 1123F ACP DISCUSS/DSCN MKR DOCD: CPT | Performed by: INTERNAL MEDICINE

## 2023-06-22 RX ORDER — TADALAFIL 5 MG/1
5 TABLET ORAL DAILY
Qty: 30 TABLET | Refills: 12 | Status: SHIPPED | OUTPATIENT
Start: 2023-06-22

## 2023-07-14 ENCOUNTER — NURSE ONLY (OUTPATIENT)
Dept: UROLOGY | Age: 68
End: 2023-07-14

## 2023-07-14 DIAGNOSIS — N40.0 BPH WITH ELEVATED PSA: Primary | ICD-10-CM

## 2023-07-14 DIAGNOSIS — R97.20 BPH WITH ELEVATED PSA: Primary | ICD-10-CM

## 2023-07-14 LAB — PSA SERPL-MCNC: 4.7 NG/ML

## 2023-07-21 ENCOUNTER — OFFICE VISIT (OUTPATIENT)
Dept: UROLOGY | Age: 68
End: 2023-07-21

## 2023-07-21 DIAGNOSIS — N40.0 BPH WITH ELEVATED PSA: Primary | ICD-10-CM

## 2023-07-21 DIAGNOSIS — N40.0 BENIGN PROSTATIC HYPERPLASIA, UNSPECIFIED WHETHER LOWER URINARY TRACT SYMPTOMS PRESENT: ICD-10-CM

## 2023-07-21 DIAGNOSIS — N52.9 ERECTILE DYSFUNCTION, UNSPECIFIED ERECTILE DYSFUNCTION TYPE: ICD-10-CM

## 2023-07-21 DIAGNOSIS — R97.20 BPH WITH ELEVATED PSA: Primary | ICD-10-CM

## 2023-07-21 LAB
BILIRUBIN, URINE, POC: NEGATIVE
BLOOD URINE, POC: NEGATIVE
GLUCOSE URINE, POC: NEGATIVE
KETONES, URINE, POC: NEGATIVE
LEUKOCYTE ESTERASE, URINE, POC: NEGATIVE
NITRITE, URINE, POC: NEGATIVE
PH, URINE, POC: 5.5 (ref 4.6–8)
PROTEIN,URINE, POC: NEGATIVE
SPECIFIC GRAVITY, URINE, POC: 1.02 (ref 1–1.03)
URINALYSIS CLARITY, POC: NORMAL
URINALYSIS COLOR, POC: NORMAL
UROBILINOGEN, POC: NORMAL

## 2023-07-21 RX ORDER — TADALAFIL 5 MG/1
5 TABLET ORAL DAILY
Qty: 30 TABLET | Refills: 12 | Status: SHIPPED | OUTPATIENT
Start: 2023-07-21

## 2023-07-21 ASSESSMENT — ENCOUNTER SYMPTOMS
COUGH: 0
BACK PAIN: 0

## 2023-07-21 NOTE — PROGRESS NOTES
[facility]  [address]  [phone]    Alejandro Santos  : 1955    Chief Complaint   Patient presents with    Follow-up        HPI     Alejandro Santos is a 78 y.o. male  with elevated PSA, BPH/LUTS. PSA had been stable but went up to 5.2 in 3-22. PSA was 2.6 in , 3.2 in , 3.3 in , 3.3. in . PSA was 4.34 in  at work physical.   He has had BPH/LUTS for years. Has weak stream. Has had several episodes where he thought he might not be able to void. Takes Cialis 5 mg daily. , he thinks that he mainly needs it for his LUTS. ED is not much of a problem. Likes Cialis better than tamsulosin. No family history of prostate cancer. JENNIFER showed 3+ prostate without nodules. In , total PSA 3.7 with 24.3% free PSA. ??  PSA 4.1 in , 4.7 in . No past medical history on file. Past Surgical History:   Procedure Laterality Date    HERNIA REPAIR      OTHER SURGICAL HISTORY      Hydrocell     Current Outpatient Medications   Medication Sig Dispense Refill    tadalafil (CIALIS) 5 MG tablet Take 1 tablet by mouth daily 30 tablet 12     No current facility-administered medications for this visit.      Allergies   Allergen Reactions    Flaxseed Hives     Social History     Socioeconomic History    Marital status:      Spouse name: Not on file    Number of children: Not on file    Years of education: Not on file    Highest education level: Not on file   Occupational History    Not on file   Tobacco Use    Smoking status: Never    Smokeless tobacco: Never   Vaping Use    Vaping Use: Never used   Substance and Sexual Activity    Alcohol use: Yes    Drug use: No    Sexual activity: Not on file   Other Topics Concern    Not on file   Social History Narrative    Not on file     Social Determinants of Health     Financial Resource Strain: Low Risk     Difficulty of Paying Living Expenses: Not hard at all   Food

## 2024-01-15 ENCOUNTER — NURSE ONLY (OUTPATIENT)
Dept: UROLOGY | Age: 69
End: 2024-01-15

## 2024-01-15 DIAGNOSIS — R97.20 BPH WITH ELEVATED PSA: ICD-10-CM

## 2024-01-15 DIAGNOSIS — N40.0 BPH WITH ELEVATED PSA: ICD-10-CM

## 2024-01-16 LAB
PSA FREE MFR SERPL: 22.6 %
PSA FREE SERPL-MCNC: 1.2 NG/ML
PSA SERPL-MCNC: 5.3 NG/ML

## 2024-01-26 ENCOUNTER — TELEPHONE (OUTPATIENT)
Dept: UROLOGY | Age: 69
End: 2024-01-26

## 2024-01-29 ENCOUNTER — OFFICE VISIT (OUTPATIENT)
Dept: UROLOGY | Age: 69
End: 2024-01-29
Payer: MEDICARE

## 2024-01-29 DIAGNOSIS — N52.9 ERECTILE DYSFUNCTION, UNSPECIFIED ERECTILE DYSFUNCTION TYPE: ICD-10-CM

## 2024-01-29 DIAGNOSIS — R97.20 ELEVATED PROSTATE SPECIFIC ANTIGEN (PSA): ICD-10-CM

## 2024-01-29 DIAGNOSIS — N40.0 BPH WITH ELEVATED PSA: Primary | ICD-10-CM

## 2024-01-29 DIAGNOSIS — R97.20 BPH WITH ELEVATED PSA: Primary | ICD-10-CM

## 2024-01-29 PROCEDURE — G8420 CALC BMI NORM PARAMETERS: HCPCS | Performed by: UROLOGY

## 2024-01-29 PROCEDURE — G8484 FLU IMMUNIZE NO ADMIN: HCPCS | Performed by: UROLOGY

## 2024-01-29 PROCEDURE — 1123F ACP DISCUSS/DSCN MKR DOCD: CPT | Performed by: UROLOGY

## 2024-01-29 PROCEDURE — 1036F TOBACCO NON-USER: CPT | Performed by: UROLOGY

## 2024-01-29 PROCEDURE — G8428 CUR MEDS NOT DOCUMENT: HCPCS | Performed by: UROLOGY

## 2024-01-29 PROCEDURE — 3017F COLORECTAL CA SCREEN DOC REV: CPT | Performed by: UROLOGY

## 2024-01-29 PROCEDURE — 81003 URINALYSIS AUTO W/O SCOPE: CPT | Performed by: UROLOGY

## 2024-01-29 PROCEDURE — 99213 OFFICE O/P EST LOW 20 MIN: CPT | Performed by: UROLOGY

## 2024-01-29 ASSESSMENT — ENCOUNTER SYMPTOMS
WHEEZING: 0
RESPIRATORY NEGATIVE: 1

## 2024-01-29 NOTE — PROGRESS NOTES
Concern    Not on file   Social History Narrative    Not on file     Social Determinants of Health     Financial Resource Strain: Low Risk  (6/20/2023)    Overall Financial Resource Strain (CARDIA)     Difficulty of Paying Living Expenses: Not hard at all   Food Insecurity: Not on file (6/20/2023)   Transportation Needs: Unknown (6/20/2023)    PRAPARE - Transportation     Lack of Transportation (Medical): Not on file     Lack of Transportation (Non-Medical): No   Physical Activity: Sufficiently Active (6/20/2023)    Exercise Vital Sign     Days of Exercise per Week: 4 days     Minutes of Exercise per Session: 70 min   Stress: Not on file   Social Connections: Not on file   Intimate Partner Violence: Not on file   Housing Stability: Unknown (6/20/2023)    Housing Stability Vital Sign     Unable to Pay for Housing in the Last Year: Not on file     Number of Places Lived in the Last Year: Not on file     Unstable Housing in the Last Year: No     Family History   Problem Relation Age of Onset    Alzheimer's Disease Father     Heart Disease Mother     Cancer Mother         breast       Review of Systems  Constitutional: Negative  Negative for fever.  Respiratory: Respiratory negative Negative for wheezing.  Cardiovascular: Neg cardio ROS Negative for chest pain.      There were no vitals taken for this visit.    Urinalysis  UA - Dipstick  Results for orders placed or performed in visit on 01/29/24   AMB POC URINALYSIS DIP STICK AUTO W/O MICRO   Result Value Ref Range    Color (UA POC)      Clarity (UA POC)      Glucose, Urine, POC Negative     Bilirubin, Urine, POC Negative     KETONES, Urine, POC Negative     Specific Gravity, Urine, POC 1.025 1.001 - 1.035    Blood (UA POC) Negative     pH, Urine, POC 5.5 4.6 - 8.0    Protein, Urine, POC Negative     Urobilinogen, POC 0.2 mg/dL     Nitrite, Urine, POC Negative     Leukocyte Esterase, Urine, POC Negative    Results for orders placed or performed in visit on 11/13/20

## 2024-02-14 ENCOUNTER — HOSPITAL ENCOUNTER (OUTPATIENT)
Dept: MRI IMAGING | Age: 69
Discharge: HOME | End: 2024-02-17
Attending: UROLOGY
Payer: MEDICARE

## 2024-02-14 DIAGNOSIS — R97.20 ELEVATED PROSTATE SPECIFIC ANTIGEN (PSA): ICD-10-CM

## 2024-02-14 PROCEDURE — A9579 GAD-BASE MR CONTRAST NOS,1ML: HCPCS | Performed by: UROLOGY

## 2024-02-14 PROCEDURE — 6360000004 HC RX CONTRAST MEDICATION: Performed by: UROLOGY

## 2024-02-14 PROCEDURE — 72197 MRI PELVIS W/O & W/DYE: CPT

## 2024-02-14 RX ADMIN — GADOTERIDOL 15 ML: 279.3 INJECTION, SOLUTION INTRAVENOUS at 08:01

## 2024-02-22 ENCOUNTER — TELEPHONE (OUTPATIENT)
Dept: UROLOGY | Age: 69
End: 2024-02-22

## 2024-02-22 NOTE — TELEPHONE ENCOUNTER
----- Message from Tata Rodriguez RN sent at 2/21/2024  8:07 AM EST -----  Regarding: FW: Appointment  Contact: [phone]  MRI results are available  ----- Message -----  From: Migue Mcelroy \"Stas\"  Sent: 2/21/2024   7:35 AM EST  To: #  Subject: Appointment                                      Has Dr Child reviewed the MRI results?

## 2024-02-22 NOTE — TELEPHONE ENCOUNTER
----- Message from Tata Rodriguez RN sent at 2/21/2024  8:07 AM EST -----  Regarding: FW: Appointment  Contact: [phone]  MRI results are available  ----- Message -----  From: Migue Mcelroy \"Stas\"  Sent: 2/21/2024   7:35 AM EST  To: #  Subject: Appointment                                      Has Dr Child reviewed the MRI results?

## 2024-02-22 NOTE — TELEPHONE ENCOUNTER
Called pt about his MRI , shows PIRADS 3 lesion  109 gm prostate.   He wants tx for BPH which would spare his antegrade ejaculation.   Prostate too large for Rezum or Urolift.   He has read about ITIND.  Will look into this   Needs PSA II in 6 months.

## 2024-03-19 NOTE — TELEPHONE ENCOUNTER
Called pt, Rezum rep thought that he could still have Rezum procedure. He will call if he wants to have it .  Needs appt in 6 months with PSA II before .

## 2024-04-24 ENCOUNTER — TELEPHONE (OUTPATIENT)
Dept: UROLOGY | Age: 69
End: 2024-04-24

## 2024-06-14 DIAGNOSIS — N40.0 BENIGN PROSTATIC HYPERPLASIA WITHOUT LOWER URINARY TRACT SYMPTOMS: Primary | ICD-10-CM

## 2024-06-21 ENCOUNTER — NURSE ONLY (OUTPATIENT)
Dept: INTERNAL MEDICINE CLINIC | Facility: CLINIC | Age: 69
End: 2024-06-21

## 2024-06-21 DIAGNOSIS — N40.0 BENIGN PROSTATIC HYPERPLASIA WITHOUT LOWER URINARY TRACT SYMPTOMS: ICD-10-CM

## 2024-06-21 LAB
ALBUMIN SERPL-MCNC: 4 G/DL (ref 3.2–4.6)
ALBUMIN/GLOB SERPL: 1.4 (ref 1–1.9)
ALP SERPL-CCNC: 68 U/L (ref 40–129)
ALT SERPL-CCNC: 21 U/L (ref 12–65)
ANION GAP SERPL CALC-SCNC: 8 MMOL/L (ref 9–18)
AST SERPL-CCNC: 26 U/L (ref 15–37)
BASOPHILS # BLD: 0 K/UL (ref 0–0.2)
BASOPHILS NFR BLD: 0 % (ref 0–2)
BILIRUB SERPL-MCNC: 1.4 MG/DL (ref 0–1.2)
BUN SERPL-MCNC: 16 MG/DL (ref 8–23)
CALCIUM SERPL-MCNC: 9.3 MG/DL (ref 8.8–10.2)
CHLORIDE SERPL-SCNC: 103 MMOL/L (ref 98–107)
CHOLEST SERPL-MCNC: 237 MG/DL (ref 0–200)
CO2 SERPL-SCNC: 28 MMOL/L (ref 20–28)
CREAT SERPL-MCNC: 0.94 MG/DL (ref 0.8–1.3)
DIFFERENTIAL METHOD BLD: NORMAL
EOSINOPHIL # BLD: 0.1 K/UL (ref 0–0.8)
EOSINOPHIL NFR BLD: 2 % (ref 0.5–7.8)
ERYTHROCYTE [DISTWIDTH] IN BLOOD BY AUTOMATED COUNT: 13.3 % (ref 11.9–14.6)
GLOBULIN SER CALC-MCNC: 2.9 G/DL (ref 2.3–3.5)
GLUCOSE SERPL-MCNC: 98 MG/DL (ref 70–99)
HCT VFR BLD AUTO: 44.1 % (ref 41.1–50.3)
HDLC SERPL-MCNC: 93 MG/DL (ref 40–60)
HDLC SERPL: 2.6 (ref 0–5)
HGB BLD-MCNC: 14.3 G/DL (ref 13.6–17.2)
IMM GRANULOCYTES # BLD AUTO: 0 K/UL (ref 0–0.5)
IMM GRANULOCYTES NFR BLD AUTO: 0 % (ref 0–5)
LDLC SERPL CALC-MCNC: 132 MG/DL (ref 0–100)
LYMPHOCYTES # BLD: 1.7 K/UL (ref 0.5–4.6)
LYMPHOCYTES NFR BLD: 37 % (ref 13–44)
MCH RBC QN AUTO: 30 PG (ref 26.1–32.9)
MCHC RBC AUTO-ENTMCNC: 32.4 G/DL (ref 31.4–35)
MCV RBC AUTO: 92.5 FL (ref 82–102)
MONOCYTES # BLD: 0.4 K/UL (ref 0.1–1.3)
MONOCYTES NFR BLD: 9 % (ref 4–12)
NEUTS SEG # BLD: 2.5 K/UL (ref 1.7–8.2)
NEUTS SEG NFR BLD: 52 % (ref 43–78)
NRBC # BLD: 0 K/UL (ref 0–0.2)
PLATELET # BLD AUTO: 243 K/UL (ref 150–450)
PMV BLD AUTO: 10.2 FL (ref 9.4–12.3)
POTASSIUM SERPL-SCNC: 4.7 MMOL/L (ref 3.5–5.1)
PROT SERPL-MCNC: 6.9 G/DL (ref 6.3–8.2)
PSA SERPL-MCNC: 5.6 NG/ML (ref 0–4)
RBC # BLD AUTO: 4.77 M/UL (ref 4.23–5.6)
SODIUM SERPL-SCNC: 139 MMOL/L (ref 136–145)
TRIGL SERPL-MCNC: 59 MG/DL (ref 0–150)
TSH, 3RD GENERATION: 1.45 UIU/ML (ref 0.27–4.2)
VLDLC SERPL CALC-MCNC: 12 MG/DL (ref 6–23)
WBC # BLD AUTO: 4.7 K/UL (ref 4.3–11.1)

## 2024-06-24 SDOH — ECONOMIC STABILITY: FOOD INSECURITY: WITHIN THE PAST 12 MONTHS, YOU WORRIED THAT YOUR FOOD WOULD RUN OUT BEFORE YOU GOT MONEY TO BUY MORE.: NEVER TRUE

## 2024-06-24 SDOH — ECONOMIC STABILITY: INCOME INSECURITY: HOW HARD IS IT FOR YOU TO PAY FOR THE VERY BASICS LIKE FOOD, HOUSING, MEDICAL CARE, AND HEATING?: NOT HARD AT ALL

## 2024-06-24 SDOH — ECONOMIC STABILITY: FOOD INSECURITY: WITHIN THE PAST 12 MONTHS, THE FOOD YOU BOUGHT JUST DIDN'T LAST AND YOU DIDN'T HAVE MONEY TO GET MORE.: NEVER TRUE

## 2024-06-24 SDOH — HEALTH STABILITY: PHYSICAL HEALTH: ON AVERAGE, HOW MANY MINUTES DO YOU ENGAGE IN EXERCISE AT THIS LEVEL?: 130 MIN

## 2024-06-24 SDOH — HEALTH STABILITY: PHYSICAL HEALTH: ON AVERAGE, HOW MANY DAYS PER WEEK DO YOU ENGAGE IN MODERATE TO STRENUOUS EXERCISE (LIKE A BRISK WALK)?: 5 DAYS

## 2024-06-24 ASSESSMENT — PATIENT HEALTH QUESTIONNAIRE - PHQ9
1. LITTLE INTEREST OR PLEASURE IN DOING THINGS: NOT AT ALL
SUM OF ALL RESPONSES TO PHQ QUESTIONS 1-9: 0
SUM OF ALL RESPONSES TO PHQ9 QUESTIONS 1 & 2: 0
2. FEELING DOWN, DEPRESSED OR HOPELESS: NOT AT ALL
SUM OF ALL RESPONSES TO PHQ QUESTIONS 1-9: 0

## 2024-06-24 ASSESSMENT — LIFESTYLE VARIABLES
HOW OFTEN DURING THE LAST YEAR HAVE YOU NEEDED AN ALCOHOLIC DRINK FIRST THING IN THE MORNING TO GET YOURSELF GOING AFTER A NIGHT OF HEAVY DRINKING: NEVER
HAVE YOU OR SOMEONE ELSE BEEN INJURED AS A RESULT OF YOUR DRINKING: NO
HAS A RELATIVE, FRIEND, DOCTOR, OR ANOTHER HEALTH PROFESSIONAL EXPRESSED CONCERN ABOUT YOUR DRINKING OR SUGGESTED YOU CUT DOWN: NO
HAS A RELATIVE, FRIEND, DOCTOR, OR ANOTHER HEALTH PROFESSIONAL EXPRESSED CONCERN ABOUT YOUR DRINKING OR SUGGESTED YOU CUT DOWN: NO
HOW OFTEN DURING THE LAST YEAR HAVE YOU HAD A FEELING OF GUILT OR REMORSE AFTER DRINKING: NEVER
HOW OFTEN DURING THE LAST YEAR HAVE YOU BEEN UNABLE TO REMEMBER WHAT HAPPENED THE NIGHT BEFORE BECAUSE YOU HAD BEEN DRINKING: NEVER
HOW OFTEN DURING THE LAST YEAR HAVE YOU FAILED TO DO WHAT WAS NORMALLY EXPECTED FROM YOU BECAUSE OF DRINKING: NEVER
HOW MANY STANDARD DRINKS CONTAINING ALCOHOL DO YOU HAVE ON A TYPICAL DAY: 1
HOW OFTEN DURING THE LAST YEAR HAVE YOU NEEDED AN ALCOHOLIC DRINK FIRST THING IN THE MORNING TO GET YOURSELF GOING AFTER A NIGHT OF HEAVY DRINKING: NEVER
HAVE YOU OR SOMEONE ELSE BEEN INJURED AS A RESULT OF YOUR DRINKING: NO
HOW OFTEN DO YOU HAVE A DRINK CONTAINING ALCOHOL: 5
HOW MANY STANDARD DRINKS CONTAINING ALCOHOL DO YOU HAVE ON A TYPICAL DAY: 1 OR 2
HOW OFTEN DURING THE LAST YEAR HAVE YOU HAD A FEELING OF GUILT OR REMORSE AFTER DRINKING: NEVER
HOW OFTEN DO YOU HAVE A DRINK CONTAINING ALCOHOL: 4 OR MORE TIMES A WEEK
HOW OFTEN DURING THE LAST YEAR HAVE YOU FOUND THAT YOU WERE NOT ABLE TO STOP DRINKING ONCE YOU HAD STARTED: NEVER
HOW OFTEN DURING THE LAST YEAR HAVE YOU FAILED TO DO WHAT WAS NORMALLY EXPECTED FROM YOU BECAUSE OF DRINKING: NEVER
HOW OFTEN DURING THE LAST YEAR HAVE YOU BEEN UNABLE TO REMEMBER WHAT HAPPENED THE NIGHT BEFORE BECAUSE YOU HAD BEEN DRINKING: NEVER
HOW OFTEN DO YOU HAVE SIX OR MORE DRINKS ON ONE OCCASION: 1
HOW OFTEN DURING THE LAST YEAR HAVE YOU FOUND THAT YOU WERE NOT ABLE TO STOP DRINKING ONCE YOU HAD STARTED: NEVER

## 2024-06-27 ENCOUNTER — OFFICE VISIT (OUTPATIENT)
Dept: INTERNAL MEDICINE CLINIC | Facility: CLINIC | Age: 69
End: 2024-06-27
Payer: MEDICARE

## 2024-06-27 VITALS
WEIGHT: 155 LBS | HEART RATE: 46 BPM | OXYGEN SATURATION: 100 % | TEMPERATURE: 97.4 F | HEIGHT: 70 IN | SYSTOLIC BLOOD PRESSURE: 128 MMHG | DIASTOLIC BLOOD PRESSURE: 80 MMHG | BODY MASS INDEX: 22.19 KG/M2

## 2024-06-27 DIAGNOSIS — N52.9 ERECTILE DYSFUNCTION, UNSPECIFIED ERECTILE DYSFUNCTION TYPE: ICD-10-CM

## 2024-06-27 DIAGNOSIS — Z00.00 MEDICARE ANNUAL WELLNESS VISIT, SUBSEQUENT: Primary | ICD-10-CM

## 2024-06-27 DIAGNOSIS — N40.1 BENIGN PROSTATIC HYPERPLASIA WITH LOWER URINARY TRACT SYMPTOMS, SYMPTOM DETAILS UNSPECIFIED: ICD-10-CM

## 2024-06-27 PROCEDURE — 1123F ACP DISCUSS/DSCN MKR DOCD: CPT | Performed by: INTERNAL MEDICINE

## 2024-06-27 PROCEDURE — 1036F TOBACCO NON-USER: CPT | Performed by: INTERNAL MEDICINE

## 2024-06-27 PROCEDURE — G8420 CALC BMI NORM PARAMETERS: HCPCS | Performed by: INTERNAL MEDICINE

## 2024-06-27 PROCEDURE — 3017F COLORECTAL CA SCREEN DOC REV: CPT | Performed by: INTERNAL MEDICINE

## 2024-06-27 PROCEDURE — 99213 OFFICE O/P EST LOW 20 MIN: CPT | Performed by: INTERNAL MEDICINE

## 2024-06-27 PROCEDURE — G8427 DOCREV CUR MEDS BY ELIG CLIN: HCPCS | Performed by: INTERNAL MEDICINE

## 2024-06-27 PROCEDURE — G0439 PPPS, SUBSEQ VISIT: HCPCS | Performed by: INTERNAL MEDICINE

## 2024-06-27 RX ORDER — TADALAFIL 5 MG/1
5 TABLET ORAL DAILY
Qty: 30 TABLET | Refills: 12 | Status: SHIPPED | OUTPATIENT
Start: 2024-06-27

## 2024-06-27 NOTE — PATIENT INSTRUCTIONS
The medication list included in this document is our record of what you are currently taking, including any changes that were made at today's visit.  If you find any differences when compared to your medications at home, or have any questions that were not answered at your visit, please contact the office.    Follow up in one year with labs prior.  See orders.      MELODIE NGUYEN MD    Return in 1 year (on 6/27/2025).   A Healthy Heart: Care Instructions  Overview     Coronary artery disease, also called heart disease, occurs when a substance called plaque builds up in the vessels that supply oxygen-rich blood to your heart muscle. This can narrow the blood vessels and reduce blood flow. A heart attack happens when blood flow is completely blocked. A high-fat diet, smoking, and other factors increase the risk of heart disease.  Your doctor has found that you have a chance of having heart disease. A heart-healthy lifestyle can help keep your heart healthy and prevent heart disease. This lifestyle includes eating healthy, being active, staying at a weight that's healthy for you, and not smoking or using tobacco. It also includes taking medicines as directed, managing other health conditions, and trying to get a healthy amount of sleep.  Follow-up care is a key part of your treatment and safety. Be sure to make and go to all appointments, and call your doctor if you are having problems. It's also a good idea to know your test results and keep a list of the medicines you take.  How can you care for yourself at home?  Diet    Use less salt when you cook and eat. This helps lower your blood pressure. Taste food before salting. Add only a little salt when you think you need it. With time, your taste buds will adjust to less salt.     Eat fewer snack items, fast foods, canned soups, and other high-salt, high-fat, processed foods.     Read food labels and try to avoid saturated and trans fats. They increase your

## 2024-06-27 NOTE — PROGRESS NOTES
(5' 10\")      Body mass index is 22.24 kg/m².        Physical Exam  Vitals and nursing note reviewed. Exam conducted with a chaperone present.   Constitutional:       General: He is not in acute distress.     Appearance: Normal appearance. He is normal weight.   HENT:      Head: Normocephalic and atraumatic.      Right Ear: Tympanic membrane, ear canal and external ear normal. There is no impacted cerumen.      Left Ear: Tympanic membrane, ear canal and external ear normal. There is no impacted cerumen.      Nose: Nose normal.   Eyes:      Extraocular Movements: Extraocular movements intact.      Conjunctiva/sclera: Conjunctivae normal.      Pupils: Pupils are equal, round, and reactive to light.   Neck:      Vascular: No carotid bruit.   Cardiovascular:      Rate and Rhythm: Normal rate and regular rhythm.      Pulses: Normal pulses.      Heart sounds: Normal heart sounds. No murmur heard.     No friction rub. No gallop.   Pulmonary:      Effort: Pulmonary effort is normal.      Breath sounds: Normal breath sounds.   Abdominal:      General: Abdomen is flat. Bowel sounds are normal.      Palpations: Abdomen is soft.      Tenderness: There is no abdominal tenderness.   Musculoskeletal:      Cervical back: Normal range of motion and neck supple.      Right lower leg: No edema.      Left lower leg: No edema.   Lymphadenopathy:      Cervical: No cervical adenopathy.   Skin:     General: Skin is warm and dry.      Coloration: Skin is not jaundiced or pale.      Findings: No erythema.   Neurological:      General: No focal deficit present.      Mental Status: He is alert and oriented to person, place, and time. Mental status is at baseline.   Psychiatric:         Mood and Affect: Mood normal.         Behavior: Behavior normal.         Thought Content: Thought content normal.         Judgment: Judgment normal.            Allergies   Allergen Reactions    Flaxseed Hives     Prior to Visit Medications    Medication Sig

## 2024-06-29 PROBLEM — N52.9 ERECTILE DYSFUNCTION: Status: ACTIVE | Noted: 2024-06-29

## 2024-06-29 ASSESSMENT — ENCOUNTER SYMPTOMS
COUGH: 0
SHORTNESS OF BREATH: 0
BACK PAIN: 0

## 2024-06-29 NOTE — ASSESSMENT & PLAN NOTE
Monitored by specialist- no acute findings meriting change in the plan    MRI prostate reviewed and PSA discussed and urology notes reviewed and discussed.      Lab Results   Component Value Date    PSA 5.6 (H) 06/21/2024    PSA 5.3 (H) 01/15/2024    PSA 4.7 (H) 07/14/2023    PSA 4.1 (H) 06/15/2023    PSA 3.7 07/13/2022    PSA 5.2 (H) 03/08/2022    PSA 3.3 11/13/2020       Advised if prostate bx was indicated, I would encourage him to proceed with this as directed by urology.      He is hesitant about urological procedures for BPH      He will continue to follow with urology and has an upcoming appointment later this year.  PSA was slightly higher and the patient indicated before he had a prostate biopsy, would like to have another MRI?  I understand, and I can assist if need be, but I think if the PSA does continue to increase, the prostate may need biopsy and not sure another mri would be very helpful.  Prostate cancer remains in the differential and close monitoring is indicated.  I provided the patient with literature and recommended he consider reading \"surviving prostate cancer\" by Dr. Thanh Menon.  I think this would be a good resource for him and may provide some answers and education on this very complex topic.      Cialis 5 mg refilled for BPH and ED.

## 2024-10-15 ENCOUNTER — LAB (OUTPATIENT)
Dept: UROLOGY | Age: 69
End: 2024-10-15

## 2024-10-15 DIAGNOSIS — N40.0 BPH WITH ELEVATED PSA: ICD-10-CM

## 2024-10-15 DIAGNOSIS — R97.20 BPH WITH ELEVATED PSA: Primary | ICD-10-CM

## 2024-10-15 DIAGNOSIS — R97.20 BPH WITH ELEVATED PSA: ICD-10-CM

## 2024-10-15 DIAGNOSIS — N40.0 BPH WITH ELEVATED PSA: Primary | ICD-10-CM

## 2024-10-15 DIAGNOSIS — R97.20 ELEVATED PROSTATE SPECIFIC ANTIGEN (PSA): ICD-10-CM

## 2024-10-15 LAB
PSA FREE MFR SERPL: 27.7 %
PSA FREE SERPL-MCNC: 1.6 NG/ML
PSA SERPL-MCNC: 5.8 NG/ML (ref 0–4)

## 2024-10-24 ENCOUNTER — OFFICE VISIT (OUTPATIENT)
Dept: INTERNAL MEDICINE CLINIC | Facility: CLINIC | Age: 69
End: 2024-10-24

## 2024-10-24 VITALS
BODY MASS INDEX: 22.9 KG/M2 | WEIGHT: 160 LBS | TEMPERATURE: 98.2 F | SYSTOLIC BLOOD PRESSURE: 122 MMHG | HEART RATE: 69 BPM | DIASTOLIC BLOOD PRESSURE: 60 MMHG | HEIGHT: 70 IN | OXYGEN SATURATION: 100 %

## 2024-10-24 DIAGNOSIS — K40.90 INGUINAL HERNIA OF RIGHT SIDE WITHOUT OBSTRUCTION OR GANGRENE: Primary | ICD-10-CM

## 2024-10-24 DIAGNOSIS — N40.1 BENIGN PROSTATIC HYPERPLASIA WITH INCOMPLETE BLADDER EMPTYING: ICD-10-CM

## 2024-10-24 DIAGNOSIS — R39.14 BENIGN PROSTATIC HYPERPLASIA WITH INCOMPLETE BLADDER EMPTYING: ICD-10-CM

## 2024-10-24 DIAGNOSIS — N52.9 ERECTILE DYSFUNCTION, UNSPECIFIED ERECTILE DYSFUNCTION TYPE: ICD-10-CM

## 2024-10-24 NOTE — ASSESSMENT & PLAN NOTE
The hernia is not life-threatening and can be managed electively. He was advised to avoid lifting heavy objects and straining, and to use a truss for support, which he reports helps when doing strenuous activities like yard work. He was informed that the hernia will not resolve spontaneously and that his healthy weight reduces the likelihood of it enlarging. A referral was made to Dr. Edwin Elliott for further management. He was advised to seek immediate medical attention or visit the emergency room if he experiences severe abdominal or groin pain, nausea, or vomiting.  
Cancer Information Summaries [Internet]. [place] ([place]): National Cancer [place] ([place]); 2002-. Available from: https://www.ncbi.nlm.nih.gov/books/VMC88133/

## 2024-10-24 NOTE — PROGRESS NOTES
Chief Complaint   Patient presents with    Other     Pt would like to get checked for a possible hernia.        Migue Mcelroy 69 y.o. male     History of Present Illness  The patient presents for evaluation of a hernia.                            He has a history of two previous hernia repairs and is currently experiencing a bulge in the same area. He attributes this to physical activities such as clearing brush, pushing, pulling, and coughing. He reports a sensation of the hernia moving in and out, accompanied by a squishing sound. He has found relief from his hernia symptoms by using a truss while performing yard work.    He mentions that his life insurance company has offered him a free cancer screening test called Galleri. His primary concern is prostate cancer, as an MRI conducted for his prostate revealed a fatty deposit. His most recent blood test showed an increase in free PSA levels. He has an upcoming appointment with his doctor next week and was advised to undergo a biopsy before any other procedures.    He has been taking tadalafil intermittently but noticed a worsening of his symptoms when he stopped taking it for a month. He does not take antihistamines or cough medicines but recently took Motrin after strenuous yard work. He also takes Mucinex occasionally, about once every five years.    FAMILY HISTORY  He is not aware of any family history of prostate cancer.          /60 (Site: Left Upper Arm, Position: Sitting, Cuff Size: Medium Adult)   Pulse 69   Temp 98.2 °F (36.8 °C) (Temporal)   Ht 1.778 m (5' 10\")   Wt 72.6 kg (160 lb)   SpO2 100%   BMI 22.96 kg/m²   Physical Exam  Vitals and nursing note reviewed.   Constitutional:       General: He is not in acute distress.     Appearance: Normal appearance. He is not ill-appearing.   HENT:      Head: Normocephalic and atraumatic.   Eyes:      Extraocular Movements: Extraocular movements intact.      Conjunctiva/sclera: Conjunctivae

## 2024-10-28 ENCOUNTER — OFFICE VISIT (OUTPATIENT)
Dept: UROLOGY | Age: 69
End: 2024-10-28
Payer: MEDICARE

## 2024-10-28 DIAGNOSIS — N52.9 ERECTILE DYSFUNCTION, UNSPECIFIED ERECTILE DYSFUNCTION TYPE: ICD-10-CM

## 2024-10-28 DIAGNOSIS — R97.20 ELEVATED PROSTATE SPECIFIC ANTIGEN (PSA): ICD-10-CM

## 2024-10-28 DIAGNOSIS — N40.0 BPH WITH ELEVATED PSA: Primary | ICD-10-CM

## 2024-10-28 DIAGNOSIS — R97.20 BPH WITH ELEVATED PSA: Primary | ICD-10-CM

## 2024-10-28 LAB
BILIRUBIN, URINE, POC: NEGATIVE
BLOOD URINE, POC: NEGATIVE
GLUCOSE URINE, POC: NEGATIVE MG/DL
KETONES, URINE, POC: NEGATIVE MG/DL
LEUKOCYTE ESTERASE, URINE, POC: NEGATIVE
NITRITE, URINE, POC: NEGATIVE
PH, URINE, POC: 6.5 (ref 4.6–8)
PROTEIN,URINE, POC: 30 MG/DL
SPECIFIC GRAVITY, URINE, POC: 1.02 (ref 1–1.03)
URINALYSIS CLARITY, POC: NORMAL
URINALYSIS COLOR, POC: NORMAL
UROBILINOGEN, POC: NORMAL MG/DL

## 2024-10-28 PROCEDURE — 3017F COLORECTAL CA SCREEN DOC REV: CPT | Performed by: UROLOGY

## 2024-10-28 PROCEDURE — 99214 OFFICE O/P EST MOD 30 MIN: CPT | Performed by: UROLOGY

## 2024-10-28 PROCEDURE — G8420 CALC BMI NORM PARAMETERS: HCPCS | Performed by: UROLOGY

## 2024-10-28 PROCEDURE — 81003 URINALYSIS AUTO W/O SCOPE: CPT | Performed by: UROLOGY

## 2024-10-28 PROCEDURE — 1159F MED LIST DOCD IN RCRD: CPT | Performed by: UROLOGY

## 2024-10-28 PROCEDURE — 1123F ACP DISCUSS/DSCN MKR DOCD: CPT | Performed by: UROLOGY

## 2024-10-28 PROCEDURE — G8484 FLU IMMUNIZE NO ADMIN: HCPCS | Performed by: UROLOGY

## 2024-10-28 PROCEDURE — G8427 DOCREV CUR MEDS BY ELIG CLIN: HCPCS | Performed by: UROLOGY

## 2024-10-28 PROCEDURE — 1036F TOBACCO NON-USER: CPT | Performed by: UROLOGY

## 2024-10-28 RX ORDER — ALFUZOSIN HYDROCHLORIDE 10 MG/1
10 TABLET, EXTENDED RELEASE ORAL DAILY
Qty: 30 TABLET | Refills: 11 | Status: SHIPPED | OUTPATIENT
Start: 2024-10-28

## 2024-10-28 ASSESSMENT — ENCOUNTER SYMPTOMS
NAUSEA: 0
BACK PAIN: 0

## 2024-10-28 NOTE — PROGRESS NOTES
[facility]  [address] [place] [id number]  [phone]    Migue Mcelroy  : 1955    Chief Complaint   Patient presents with    Follow-up        HPI     Migue Mcelroy is a 69 y.o. male  with elevated PSA, BPH/LUTS.   PSA had been stable but went up to 5.2 in 3-22. PSA was 2.6 in , 3.2 in , 3.3 in , 3.3. in .  PSA was 4.34 in  at work physical.   He has had BPH/LUTS for years. Has weak stream. Has had several episodes where he thought he might not be able to void.   Takes Cialis 5 mg daily., he thinks that he mainly needs it for his LUTS. ED is not much of a problem. Likes Cialis better than tamsulosin. No family history of prostate cancer.   JENNIFER showed 3+ prostate without nodules.   In , total PSA 3.7 with 24.3% free PSA.   PSA 4.1 in , 4.7 in .   Discussed surgical treatments including Rezum, Urolift, TURP. He is sexually active and is concerned about retrograde ejaculation.    In , total PSA 5.3 with 22.6% free PSA.  Currently on Cialis 5 mg daily.   MRI in : FINDINGS:  Prostate Size: 6.8 x 6.3 x 5.1 cm with a calculated volume of 109 mL.     Peripheral Zone:No suspicious peripheral zone lesion. Scattered linear and  wedge-shaped T2 hypointensities suggesting sequelae of prior prostatitis and/or  fibrosis.        Transition/Central Zone: Focal lesion(s) described below.     BPH: Severe cystic and nodular BPH.     Lesion # 1  -Size and location: 1.7 cm mildly heterogeneous, right mid-apex medial  transition zone  -T2 Axial image: 21  -DWI/ADC: No diffusion restriction.  -DCE: The lesion demonstrates enhancement.  -Prostate Margin: No evidence of extracapsular extension.  -PI-RADS Category: 3     Neurovascular Bundles:  Intact.      Seminal Vesicles: Seminal vesicles are unremarkable.     Lymph Nodes:  No appreciable pelvic lymphadenopathy.     Additional Information: Right inguinal small fat-containing

## 2025-01-06 ENCOUNTER — PATIENT MESSAGE (OUTPATIENT)
Dept: INTERNAL MEDICINE CLINIC | Facility: CLINIC | Age: 70
End: 2025-01-06

## 2025-01-06 DIAGNOSIS — N40.1 BENIGN PROSTATIC HYPERPLASIA WITH INCOMPLETE BLADDER EMPTYING: Primary | ICD-10-CM

## 2025-01-06 DIAGNOSIS — R39.14 BENIGN PROSTATIC HYPERPLASIA WITH INCOMPLETE BLADDER EMPTYING: Primary | ICD-10-CM

## 2025-06-11 DIAGNOSIS — N40.1 BENIGN PROSTATIC HYPERPLASIA WITH INCOMPLETE BLADDER EMPTYING: Primary | ICD-10-CM

## 2025-06-11 DIAGNOSIS — R39.14 BENIGN PROSTATIC HYPERPLASIA WITH INCOMPLETE BLADDER EMPTYING: Primary | ICD-10-CM
